# Patient Record
Sex: FEMALE | Race: WHITE | Employment: OTHER | URBAN - METROPOLITAN AREA
[De-identification: names, ages, dates, MRNs, and addresses within clinical notes are randomized per-mention and may not be internally consistent; named-entity substitution may affect disease eponyms.]

---

## 2017-06-06 ENCOUNTER — OP HISTORICAL/CONVERTED ENCOUNTER (OUTPATIENT)
Dept: OTHER | Age: 67
End: 2017-06-06

## 2017-11-06 ENCOUNTER — OP HISTORICAL/CONVERTED ENCOUNTER (OUTPATIENT)
Dept: OTHER | Age: 67
End: 2017-11-06

## 2018-03-28 ENCOUNTER — IP HISTORICAL/CONVERTED ENCOUNTER (OUTPATIENT)
Dept: OTHER | Age: 68
End: 2018-03-28

## 2018-06-25 ENCOUNTER — OP HISTORICAL/CONVERTED ENCOUNTER (OUTPATIENT)
Dept: OTHER | Age: 68
End: 2018-06-25

## 2018-07-02 ENCOUNTER — IP HISTORICAL/CONVERTED ENCOUNTER (OUTPATIENT)
Dept: OTHER | Age: 68
End: 2018-07-02

## 2018-09-15 ENCOUNTER — IP HISTORICAL/CONVERTED ENCOUNTER (OUTPATIENT)
Dept: OTHER | Age: 68
End: 2018-09-15

## 2018-10-10 ENCOUNTER — IP HISTORICAL/CONVERTED ENCOUNTER (OUTPATIENT)
Dept: OTHER | Age: 68
End: 2018-10-10

## 2018-11-08 ENCOUNTER — IP HISTORICAL/CONVERTED ENCOUNTER (OUTPATIENT)
Dept: OTHER | Age: 68
End: 2018-11-08

## 2018-11-28 ENCOUNTER — OP HISTORICAL/CONVERTED ENCOUNTER (OUTPATIENT)
Dept: OTHER | Age: 68
End: 2018-11-28

## 2018-12-09 ENCOUNTER — IP HISTORICAL/CONVERTED ENCOUNTER (OUTPATIENT)
Dept: OTHER | Age: 68
End: 2018-12-09

## 2018-12-18 ENCOUNTER — IP HISTORICAL/CONVERTED ENCOUNTER (OUTPATIENT)
Dept: OTHER | Age: 68
End: 2018-12-18

## 2018-12-22 ENCOUNTER — OP HISTORICAL/CONVERTED ENCOUNTER (OUTPATIENT)
Dept: OTHER | Age: 68
End: 2018-12-22

## 2019-01-04 ENCOUNTER — IP HISTORICAL/CONVERTED ENCOUNTER (OUTPATIENT)
Dept: OTHER | Age: 69
End: 2019-01-04

## 2019-01-13 ENCOUNTER — OP HISTORICAL/CONVERTED ENCOUNTER (OUTPATIENT)
Dept: OTHER | Age: 69
End: 2019-01-13

## 2019-01-15 ENCOUNTER — OP HISTORICAL/CONVERTED ENCOUNTER (OUTPATIENT)
Dept: OTHER | Age: 69
End: 2019-01-15

## 2019-02-12 ENCOUNTER — ED HISTORICAL/CONVERTED ENCOUNTER (OUTPATIENT)
Dept: OTHER | Age: 69
End: 2019-02-12

## 2019-02-16 ENCOUNTER — ED HISTORICAL/CONVERTED ENCOUNTER (OUTPATIENT)
Dept: OTHER | Age: 69
End: 2019-02-16

## 2019-02-17 ENCOUNTER — IP HISTORICAL/CONVERTED ENCOUNTER (OUTPATIENT)
Dept: OTHER | Age: 69
End: 2019-02-17

## 2019-02-20 ENCOUNTER — TELEPHONE (OUTPATIENT)
Dept: CARDIOLOGY CLINIC | Age: 69
End: 2019-02-20

## 2019-02-20 NOTE — TELEPHONE ENCOUNTER
Patient's [facility] is calling again to r/s appt with Dr. Radha Barnett. She will not be able to do 2/22/19. Please call back @ [phone]    Thanks!

## 2019-02-25 ENCOUNTER — OP HISTORICAL/CONVERTED ENCOUNTER (OUTPATIENT)
Dept: OTHER | Age: 69
End: 2019-02-25

## 2019-02-27 ENCOUNTER — OFFICE VISIT (OUTPATIENT)
Dept: CARDIOLOGY CLINIC | Age: 69
End: 2019-02-27

## 2019-02-27 VITALS
DIASTOLIC BLOOD PRESSURE: 62 MMHG | OXYGEN SATURATION: 95 % | HEART RATE: 72 BPM | RESPIRATION RATE: 20 BRPM | WEIGHT: 197.6 LBS | BODY MASS INDEX: 36.36 KG/M2 | SYSTOLIC BLOOD PRESSURE: 110 MMHG | HEIGHT: 62 IN

## 2019-02-27 DIAGNOSIS — I48.0 PAROXYSMAL ATRIAL FIBRILLATION (HCC): Primary | ICD-10-CM

## 2019-02-27 DIAGNOSIS — I47.1 SVT (SUPRAVENTRICULAR TACHYCARDIA) (HCC): ICD-10-CM

## 2019-02-27 RX ORDER — RANOLAZINE 500 MG/1
TABLET, FILM COATED, EXTENDED RELEASE ORAL
Refills: 0 | COMMUNITY
Start: 2019-02-16

## 2019-02-27 RX ORDER — RANITIDINE 150 MG/1
150 CAPSULE ORAL 2 TIMES DAILY
COMMUNITY
End: 2020-01-01

## 2019-02-27 RX ORDER — POTASSIUM CHLORIDE 1500 MG/1
20 TABLET, FILM COATED, EXTENDED RELEASE ORAL DAILY
COMMUNITY
Start: 2019-02-22

## 2019-02-27 RX ORDER — NITROGLYCERIN 0.4 MG/1
0.4 TABLET SUBLINGUAL
COMMUNITY

## 2019-02-27 RX ORDER — ASPIRIN 81 MG/1
TABLET ORAL DAILY
COMMUNITY

## 2019-02-27 RX ORDER — MELATONIN 5 MG
5 CAPSULE ORAL
COMMUNITY

## 2019-02-27 RX ORDER — SACUBITRIL AND VALSARTAN 24; 26 MG/1; MG/1
1 TABLET, FILM COATED ORAL
COMMUNITY
Start: 2019-02-12

## 2019-02-27 RX ORDER — ALBUTEROL SULFATE 2.5 MG/.5ML
SOLUTION RESPIRATORY (INHALATION) 2 TIMES DAILY
COMMUNITY

## 2019-02-27 RX ORDER — METOPROLOL SUCCINATE 25 MG/1
TABLET, EXTENDED RELEASE ORAL
Refills: 0 | COMMUNITY
Start: 2019-01-08

## 2019-02-27 RX ORDER — ACETAMINOPHEN 500 MG
TABLET ORAL
COMMUNITY

## 2019-02-27 RX ORDER — GABAPENTIN 100 MG/1
100 CAPSULE ORAL 3 TIMES DAILY
COMMUNITY
Start: 2019-02-08

## 2019-02-27 NOTE — PROGRESS NOTES
HISTORY OF PRESENTING ILLNESS Ernestina Slater is a 70 y.o. female with ICM, LVEF 35-40%, persistent atrial fibrillation with progression in fatigue over the past 1 month, hypothyroidism, chronic SOB (since pneumonia 3 years ago) for which she takes supplemental oxygen, CAD/CABG, hypertension, Hx myocardial infarction who recently had evaluation in the ER for AF with RVR. She was intolerant of amiodarone in the past.  
 
 
 ACTIVE PROBLEM LIST There are no active problems to display for this patient. PAST MEDICAL HISTORY No past medical history on file. PAST SURGICAL HISTORY Past Surgical History:  
Procedure Laterality Date  HX  SECTION    
 HX CHOLECYSTECTOMY  HX HERNIA REPAIR ALLERGIES No Known Allergies FAMILY HISTORY Family History Problem Relation Age of Onset  Diabetes Mother  Hypertension Father  Hypertension Paternal Aunt  Diabetes Maternal Grandmother  Hypertension Paternal Grandmother  Hypertension Other   
 negative for cardiac disease SOCIAL HISTORY Social History Socioeconomic History  Marital status:  Spouse name: Not on file  Number of children: Not on file  Years of education: Not on file  Highest education level: Not on file Tobacco Use  Smoking status: Never Smoker Substance and Sexual Activity  Alcohol use: No  
 Drug use: No  
 
 
 
MEDICATIONS Current Outpatient Medications Medication Sig  
 NOVOLIN 70/30 100 unit/mL (70-30) injection INJECT 60 UNITS SUBCUTANEOUS 20 MINUTES BEFORE BREAKFAST AND 60 UNITS 20 MINUTES BEFORE DINNER  
 glucose blood VI test strips (ACCU-CHEK JUSTICE PLUS TEST STRP) strip Test 3 times daily Dx Code 250.00  ACCU-CHEK JUSTICE PLUS METER misc  sertraline (ZOLOFT) 100 mg tablet  losartan (COZAAR) 50 mg tablet  atenolol (TENORMIN) 25 mg tablet  spironolactone (ALDACTONE) 25 mg tablet  levothyroxine (SYNTHROID) 100 mcg tablet  furosemide (LASIX) 20 mg tablet  cetirizine (ZYRTEC) 10 mg tablet  aspirin (ASPIRIN) 325 mg tablet Take 325 mg by mouth daily.  metFORMIN (GLUCOPHAGE) 1,000 mg tablet Take 1 Tab by mouth two (2) times daily (with meals).  insulin syringe-needle U-100 (BD INSULIN SYRINGE ULTRA-FINE) 1 mL 31 x 15/64\" syrg 1 Syringe by SubCUTAneous route two (2) times a day.  Lancets (ACCU-CHEK SOFTCLIX LANCETS) misc Test 4 times daily. Dx code 250.00 No current facility-administered medications for this visit. I have reviewed the nurses notes, vitals, problem list, allergy list, medical history, family, social history and medications. REVIEW OF SYMPTOMS General: Pt denies excessive weight gain or loss. Pt is able to conduct ADL's HEENT: Denies blurred vision, headaches, hearing loss, epistaxis and difficulty swallowing. Respiratory: Denies cough, congestion, shortness of breath, [place]RA[place], wheezing or stridor. Cardiovascular: Denies precordial pain, palpitations, edema or PND Gastrointestinal: Denies poor appetite, indigestion, abdominal pain or blood in stool Genitourinary: Denies hematuria, dysuria, increased urinary frequency Musculoskeletal: Denies joint pain or swelling from muscles or joints Neurologic: Denies tremor, paresthesias, headache, or sensory motor disturbance Psychiatric: Denies confusion, insomnia, depression Integumentray: Denies rash, itching or ulcers. Hematologic: Denies easy bruising, bleeding PHYSICAL EXAMINATION There were no vitals filed for this visit. General: Well developed, in no acute distress. HEENT: No jaundice, oral mucosa moist, no oral ulcers Neck: Supple, no stiffness, no lymphadenopathy, supple Heart:  irregularly irregular, no murmur, gallop or rub, no jugular venous distention Respiratory: Clear bilaterally x 4, no wheezing or rales Abdomen:   Soft, non-tender, bowel sounds are active.  
 Extremities:  No edema, normal cap refill, no cyanosis. Musculoskeletal: No clubbing, no deformities Neuro: A&Ox3, speech clear, gait stable, cooperative, no focal neurologic deficits Skin: Skin color is normal. No rashes or lesions. Non diaphoretic, moist. 
Vascular: 2+ pulses symmetric in all extremities DIAGNOSTIC DATA EKG: AF 
 
 
 LABORATORY DATA No results found for: WBC, HGBPOC, HGB, HGBP, HCTPOC, HCT, PHCT, RBCH, PLT, MCV, HGBEXT, HCTEXT, PLTEXT No results found for: NA, K, CL, CO2, AGAP, GLU, BUN, CREA, BUCR, GFRAA, GFRNA, CA, TBIL, TBILI, GPT, SGOT, AP, TP, ALB, GLOB, AGRAT, ALT  
 
 
 ASSESSMENT 1. Cardiomyopathy A. Ischemic B. LVEF 35-40% C. NYHA class II 
2. Fatigue 3. Atrial fibrillation A. Long-standing persistent 4. CAD, native 5. CABG 6. MRSA leg infection PLAN Given intolerance to amiodarone in the past and that she has long-standing persistent AF with difficult to control HR's, recommend CRTP and AV node ablation rather than pulmonary vein isolation (likely unable to tolerate). Will plan for procedure once MRSA infection has cleared. Hold eliquis 2 days prior. FOLLOW-UP Post procedure Thank you, Beverley Swanson MD and Dr. Nano Lyman for allowing me to participate in the care of this extraordinarily pleasant female. Please do not hesitate to contact me for further questions/concerns. Ramiro Lopez MD 
Cardiac Electrophysiology / Cardiology [address] [place] [address], [address] [place] [address], [address] 
[phone] / [phone] Fax   [phone] / [phone] Fax

## 2019-03-01 ENCOUNTER — OP HISTORICAL/CONVERTED ENCOUNTER (OUTPATIENT)
Dept: OTHER | Age: 69
End: 2019-03-01

## 2019-03-04 ENCOUNTER — OP HISTORICAL/CONVERTED ENCOUNTER (OUTPATIENT)
Dept: OTHER | Age: 69
End: 2019-03-04

## 2019-03-15 ENCOUNTER — OP HISTORICAL/CONVERTED ENCOUNTER (OUTPATIENT)
Dept: OTHER | Age: 69
End: 2019-03-15

## 2019-03-18 ENCOUNTER — OP HISTORICAL/CONVERTED ENCOUNTER (OUTPATIENT)
Dept: OTHER | Age: 69
End: 2019-03-18

## 2019-04-01 ENCOUNTER — OP HISTORICAL/CONVERTED ENCOUNTER (OUTPATIENT)
Dept: OTHER | Age: 69
End: 2019-04-01

## 2019-05-01 ENCOUNTER — OP HISTORICAL/CONVERTED ENCOUNTER (OUTPATIENT)
Dept: OTHER | Age: 69
End: 2019-05-01

## 2019-05-10 ENCOUNTER — OP HISTORICAL/CONVERTED ENCOUNTER (OUTPATIENT)
Dept: OTHER | Age: 69
End: 2019-05-10

## 2019-06-01 ENCOUNTER — OP HISTORICAL/CONVERTED ENCOUNTER (OUTPATIENT)
Dept: OTHER | Age: 69
End: 2019-06-01

## 2019-07-01 ENCOUNTER — OP HISTORICAL/CONVERTED ENCOUNTER (OUTPATIENT)
Dept: OTHER | Age: 69
End: 2019-07-01

## 2019-08-01 ENCOUNTER — OP HISTORICAL/CONVERTED ENCOUNTER (OUTPATIENT)
Dept: OTHER | Age: 69
End: 2019-08-01

## 2019-09-01 ENCOUNTER — OP HISTORICAL/CONVERTED ENCOUNTER (OUTPATIENT)
Dept: OTHER | Age: 69
End: 2019-09-01

## 2019-09-03 ENCOUNTER — ED HISTORICAL/CONVERTED ENCOUNTER (OUTPATIENT)
Dept: OTHER | Age: 69
End: 2019-09-03

## 2019-10-01 ENCOUNTER — OP HISTORICAL/CONVERTED ENCOUNTER (OUTPATIENT)
Dept: OTHER | Age: 69
End: 2019-10-01

## 2019-10-31 ENCOUNTER — OP HISTORICAL/CONVERTED ENCOUNTER (OUTPATIENT)
Dept: OTHER | Age: 69
End: 2019-10-31

## 2019-11-01 ENCOUNTER — OP HISTORICAL/CONVERTED ENCOUNTER (OUTPATIENT)
Dept: OTHER | Age: 69
End: 2019-11-01

## 2020-01-01 ENCOUNTER — HOSPITAL ENCOUNTER (INPATIENT)
Age: 70
LOS: 3 days | DRG: 284 | End: 2021-01-03
Attending: EMERGENCY MEDICINE | Admitting: FAMILY MEDICINE
Payer: MEDICARE

## 2020-01-01 ENCOUNTER — APPOINTMENT (OUTPATIENT)
Dept: GENERAL RADIOLOGY | Age: 70
DRG: 284 | End: 2020-01-01
Attending: EMERGENCY MEDICINE
Payer: MEDICARE

## 2020-01-01 DIAGNOSIS — I21.3 ST ELEVATION MYOCARDIAL INFARCTION (STEMI), UNSPECIFIED ARTERY (HCC): ICD-10-CM

## 2020-01-01 DIAGNOSIS — I48.91 ATRIAL FIBRILLATION, UNSPECIFIED TYPE (HCC): Primary | ICD-10-CM

## 2020-01-01 LAB
ALBUMIN SERPL-MCNC: 3.4 G/DL (ref 3.5–5)
ALBUMIN/GLOB SERPL: 0.8 {RATIO} (ref 1.1–2.2)
ALP SERPL-CCNC: 49 U/L (ref 45–117)
ALT SERPL-CCNC: 13 U/L (ref 12–78)
ANION GAP SERPL CALC-SCNC: 9 MMOL/L (ref 5–15)
AST SERPL W P-5'-P-CCNC: 15 U/L (ref 15–37)
ATRIAL RATE: 163 BPM
BASOPHILS # BLD: 0 K/UL (ref 0–0.1)
BASOPHILS NFR BLD: 0 % (ref 0–1)
BILIRUB SERPL-MCNC: 0.3 MG/DL (ref 0.2–1)
BUN SERPL-MCNC: 38 MG/DL (ref 6–20)
BUN/CREAT SERPL: 29 (ref 12–20)
CA-I BLD-MCNC: 9.5 MG/DL (ref 8.5–10.1)
CALCULATED R AXIS, ECG10: -177 DEGREES
CALCULATED T AXIS, ECG11: -151 DEGREES
CHLORIDE SERPL-SCNC: 101 MMOL/L (ref 97–108)
CO2 SERPL-SCNC: 28 MMOL/L (ref 21–32)
CREAT SERPL-MCNC: 1.29 MG/DL (ref 0.55–1.02)
DIAGNOSIS, 93000: NORMAL
DIFFERENTIAL METHOD BLD: ABNORMAL
EOSINOPHIL # BLD: 0.1 K/UL (ref 0–0.4)
EOSINOPHIL NFR BLD: 1 % (ref 0–7)
ERYTHROCYTE [DISTWIDTH] IN BLOOD BY AUTOMATED COUNT: 14.5 % (ref 11.5–14.5)
GLOBULIN SER CALC-MCNC: 4.1 G/DL (ref 2–4)
GLUCOSE BLD STRIP.AUTO-MCNC: 453 MG/DL (ref 65–100)
GLUCOSE BLD STRIP.AUTO-MCNC: 493 MG/DL (ref 65–100)
GLUCOSE SERPL-MCNC: 254 MG/DL (ref 65–100)
HCT VFR BLD AUTO: 37.3 % (ref 35–47)
HGB BLD-MCNC: 11.4 G/DL (ref 11.5–16)
IMM GRANULOCYTES # BLD AUTO: 0.1 K/UL (ref 0–0.04)
IMM GRANULOCYTES NFR BLD AUTO: 1 % (ref 0–0.5)
LYMPHOCYTES # BLD: 2 K/UL (ref 0.8–3.5)
LYMPHOCYTES NFR BLD: 19 % (ref 12–49)
MCH RBC QN AUTO: 29.2 PG (ref 26–34)
MCHC RBC AUTO-ENTMCNC: 30.6 G/DL (ref 30–36.5)
MCV RBC AUTO: 95.6 FL (ref 80–99)
MONOCYTES # BLD: 0.6 K/UL (ref 0–1)
MONOCYTES NFR BLD: 5 % (ref 5–13)
NEUTS SEG # BLD: 7.8 K/UL (ref 1.8–8)
NEUTS SEG NFR BLD: 74 % (ref 32–75)
PERFORMED BY, TECHID: ABNORMAL
PERFORMED BY, TECHID: ABNORMAL
PLATELET # BLD AUTO: 246 K/UL (ref 150–400)
PMV BLD AUTO: 10.3 FL (ref 8.9–12.9)
POTASSIUM SERPL-SCNC: 3.4 MMOL/L (ref 3.5–5.1)
PROT SERPL-MCNC: 7.5 G/DL (ref 6.4–8.2)
Q-T INTERVAL, ECG07: 338 MS
QRS DURATION, ECG06: 96 MS
QTC CALCULATION (BEZET), ECG08: 526 MS
RBC # BLD AUTO: 3.9 M/UL (ref 3.8–5.2)
SODIUM SERPL-SCNC: 138 MMOL/L (ref 136–145)
TROPONIN I SERPL-MCNC: <0.05 NG/ML
VENTRICULAR RATE, ECG03: 146 BPM
WBC # BLD AUTO: 10.4 K/UL (ref 3.6–11)

## 2020-01-01 PROCEDURE — 74011636637 HC RX REV CODE- 636/637: Performed by: NURSE PRACTITIONER

## 2020-01-01 PROCEDURE — 74011250637 HC RX REV CODE- 250/637: Performed by: NURSE PRACTITIONER

## 2020-01-01 PROCEDURE — 36415 COLL VENOUS BLD VENIPUNCTURE: CPT

## 2020-01-01 PROCEDURE — 85025 COMPLETE CBC W/AUTO DIFF WBC: CPT

## 2020-01-01 PROCEDURE — 74011636637 HC RX REV CODE- 636/637: Performed by: INTERNAL MEDICINE

## 2020-01-01 PROCEDURE — 65270000032 HC RM SEMIPRIVATE

## 2020-01-01 PROCEDURE — C9113 INJ PANTOPRAZOLE SODIUM, VIA: HCPCS | Performed by: EMERGENCY MEDICINE

## 2020-01-01 PROCEDURE — 96375 TX/PRO/DX INJ NEW DRUG ADDON: CPT

## 2020-01-01 PROCEDURE — 74011000258 HC RX REV CODE- 258: Performed by: EMERGENCY MEDICINE

## 2020-01-01 PROCEDURE — 99285 EMERGENCY DEPT VISIT HI MDM: CPT

## 2020-01-01 PROCEDURE — 71045 X-RAY EXAM CHEST 1 VIEW: CPT

## 2020-01-01 PROCEDURE — 74011250637 HC RX REV CODE- 250/637: Performed by: EMERGENCY MEDICINE

## 2020-01-01 PROCEDURE — 83036 HEMOGLOBIN GLYCOSYLATED A1C: CPT

## 2020-01-01 PROCEDURE — 84484 ASSAY OF TROPONIN QUANT: CPT

## 2020-01-01 PROCEDURE — 80053 COMPREHEN METABOLIC PANEL: CPT

## 2020-01-01 PROCEDURE — 96365 THER/PROPH/DIAG IV INF INIT: CPT

## 2020-01-01 PROCEDURE — 74011250636 HC RX REV CODE- 250/636: Performed by: EMERGENCY MEDICINE

## 2020-01-01 PROCEDURE — 82962 GLUCOSE BLOOD TEST: CPT

## 2020-01-01 PROCEDURE — 93005 ELECTROCARDIOGRAM TRACING: CPT

## 2020-01-01 RX ORDER — MAGNESIUM SULFATE 100 %
4 CRYSTALS MISCELLANEOUS AS NEEDED
Status: DISCONTINUED | OUTPATIENT
Start: 2020-01-01 | End: 2021-01-01 | Stop reason: HOSPADM

## 2020-01-01 RX ORDER — SODIUM CHLORIDE 0.9 % (FLUSH) 0.9 %
5-40 SYRINGE (ML) INJECTION EVERY 8 HOURS
Status: DISCONTINUED | OUTPATIENT
Start: 2020-01-01 | End: 2021-01-01 | Stop reason: HOSPADM

## 2020-01-01 RX ORDER — SODIUM CHLORIDE 0.9 % (FLUSH) 0.9 %
5-40 SYRINGE (ML) INJECTION AS NEEDED
Status: DISCONTINUED | OUTPATIENT
Start: 2020-01-01 | End: 2021-01-01 | Stop reason: HOSPADM

## 2020-01-01 RX ORDER — ONDANSETRON 2 MG/ML
4 INJECTION INTRAMUSCULAR; INTRAVENOUS
Status: DISCONTINUED | OUTPATIENT
Start: 2020-01-01 | End: 2021-01-01 | Stop reason: HOSPADM

## 2020-01-01 RX ORDER — ACETAMINOPHEN 650 MG/1
650 SUPPOSITORY RECTAL
Status: DISCONTINUED | OUTPATIENT
Start: 2020-01-01 | End: 2021-01-01 | Stop reason: HOSPADM

## 2020-01-01 RX ORDER — CHOLECALCIFEROL (VITAMIN D3) 125 MCG
5 CAPSULE ORAL
Status: DISCONTINUED | OUTPATIENT
Start: 2020-01-01 | End: 2021-01-01 | Stop reason: HOSPADM

## 2020-01-01 RX ORDER — INSULIN LISPRO 100 [IU]/ML
8 INJECTION, SOLUTION INTRAVENOUS; SUBCUTANEOUS ONCE
Status: COMPLETED | OUTPATIENT
Start: 2020-01-01 | End: 2020-01-01

## 2020-01-01 RX ORDER — POLYETHYLENE GLYCOL 3350 17 G/17G
17 POWDER, FOR SOLUTION ORAL DAILY PRN
Status: DISCONTINUED | OUTPATIENT
Start: 2020-01-01 | End: 2021-01-01 | Stop reason: HOSPADM

## 2020-01-01 RX ORDER — LEVOTHYROXINE SODIUM 100 UG/1
100 TABLET ORAL
Status: DISCONTINUED | OUTPATIENT
Start: 2021-01-01 | End: 2021-01-01 | Stop reason: HOSPADM

## 2020-01-01 RX ORDER — MAG HYDROX/ALUMINUM HYD/SIMETH 200-200-20
30 SUSPENSION, ORAL (FINAL DOSE FORM) ORAL ONCE
Status: COMPLETED | OUTPATIENT
Start: 2020-01-01 | End: 2020-01-01

## 2020-01-01 RX ORDER — PROMETHAZINE HYDROCHLORIDE 25 MG/1
12.5 TABLET ORAL
Status: DISCONTINUED | OUTPATIENT
Start: 2020-01-01 | End: 2021-01-01 | Stop reason: HOSPADM

## 2020-01-01 RX ORDER — CHOLECALCIFEROL (VITAMIN D3) 125 MCG
50 CAPSULE ORAL DAILY
COMMUNITY

## 2020-01-01 RX ORDER — MELATONIN
50 DAILY
Status: DISCONTINUED | OUTPATIENT
Start: 2021-01-01 | End: 2021-01-01 | Stop reason: HOSPADM

## 2020-01-01 RX ORDER — ASPIRIN 81 MG/1
81 TABLET ORAL DAILY
Status: DISCONTINUED | OUTPATIENT
Start: 2021-01-01 | End: 2021-01-01 | Stop reason: HOSPADM

## 2020-01-01 RX ORDER — ACETAMINOPHEN 325 MG/1
650 TABLET ORAL
Status: DISCONTINUED | OUTPATIENT
Start: 2020-01-01 | End: 2021-01-01 | Stop reason: HOSPADM

## 2020-01-01 RX ORDER — ATORVASTATIN CALCIUM 20 MG/1
10 TABLET, FILM COATED ORAL DAILY
Status: DISCONTINUED | OUTPATIENT
Start: 2021-01-01 | End: 2021-01-01 | Stop reason: HOSPADM

## 2020-01-01 RX ORDER — ICOSAPENT ETHYL 1000 MG/1
2 CAPSULE ORAL EVERY 12 HOURS
Status: DISCONTINUED | OUTPATIENT
Start: 2020-01-01 | End: 2021-01-01 | Stop reason: HOSPADM

## 2020-01-01 RX ORDER — ICOSAPENT ETHYL 500 MG/1
0.5 CAPSULE ORAL 2 TIMES DAILY
COMMUNITY

## 2020-01-01 RX ORDER — SERTRALINE HYDROCHLORIDE 50 MG/1
100 TABLET, FILM COATED ORAL DAILY
Status: DISCONTINUED | OUTPATIENT
Start: 2021-01-01 | End: 2021-01-01 | Stop reason: HOSPADM

## 2020-01-01 RX ORDER — SPIRONOLACTONE 25 MG/1
12.5 TABLET ORAL DAILY
Status: DISCONTINUED | OUTPATIENT
Start: 2021-01-01 | End: 2021-01-01 | Stop reason: HOSPADM

## 2020-01-01 RX ORDER — METOPROLOL SUCCINATE 25 MG/1
25 TABLET, EXTENDED RELEASE ORAL DAILY
Status: DISCONTINUED | OUTPATIENT
Start: 2021-01-01 | End: 2021-01-01

## 2020-01-01 RX ORDER — UREA 10 %
100 LOTION (ML) TOPICAL DAILY
COMMUNITY

## 2020-01-01 RX ORDER — ATORVASTATIN CALCIUM 10 MG/1
10 TABLET, FILM COATED ORAL DAILY
COMMUNITY

## 2020-01-01 RX ORDER — LANOLIN ALCOHOL/MO/W.PET/CERES
400 CREAM (GRAM) TOPICAL DAILY
Status: DISCONTINUED | OUTPATIENT
Start: 2021-01-01 | End: 2021-01-01 | Stop reason: HOSPADM

## 2020-01-01 RX ORDER — GABAPENTIN 100 MG/1
100 CAPSULE ORAL 3 TIMES DAILY
Status: DISCONTINUED | OUTPATIENT
Start: 2020-01-01 | End: 2021-01-01 | Stop reason: HOSPADM

## 2020-01-01 RX ORDER — LANOLIN ALCOHOL/MO/W.PET/CERES
400 CREAM (GRAM) TOPICAL DAILY
COMMUNITY

## 2020-01-01 RX ORDER — FAMOTIDINE 20 MG/1
20 TABLET, FILM COATED ORAL 2 TIMES DAILY
COMMUNITY

## 2020-01-01 RX ORDER — RANOLAZINE 500 MG/1
500 TABLET, EXTENDED RELEASE ORAL DAILY
Status: DISCONTINUED | OUTPATIENT
Start: 2021-01-01 | End: 2021-01-01 | Stop reason: HOSPADM

## 2020-01-01 RX ORDER — METFORMIN HYDROCHLORIDE 500 MG/1
1000 TABLET ORAL 2 TIMES DAILY WITH MEALS
Status: DISCONTINUED | OUTPATIENT
Start: 2020-01-01 | End: 2021-01-01 | Stop reason: HOSPADM

## 2020-01-01 RX ORDER — INSULIN LISPRO 100 [IU]/ML
INJECTION, SOLUTION INTRAVENOUS; SUBCUTANEOUS
Status: DISCONTINUED | OUTPATIENT
Start: 2020-01-01 | End: 2021-01-01 | Stop reason: HOSPADM

## 2020-01-01 RX ORDER — POTASSIUM CHLORIDE 750 MG/1
40 TABLET, FILM COATED, EXTENDED RELEASE ORAL
Status: COMPLETED | OUTPATIENT
Start: 2020-01-01 | End: 2020-01-01

## 2020-01-01 RX ORDER — UREA 10 %
100 LOTION (ML) TOPICAL DAILY
Status: DISCONTINUED | OUTPATIENT
Start: 2021-01-01 | End: 2021-01-01 | Stop reason: HOSPADM

## 2020-01-01 RX ORDER — DEXTROSE 50 % IN WATER (D50W) INTRAVENOUS SYRINGE
25-50 AS NEEDED
Status: DISCONTINUED | OUTPATIENT
Start: 2020-01-01 | End: 2021-01-01 | Stop reason: HOSPADM

## 2020-01-01 RX ORDER — METOLAZONE 2.5 MG/1
2.5 TABLET ORAL ONCE
Status: COMPLETED | OUTPATIENT
Start: 2020-01-01 | End: 2020-01-01

## 2020-01-01 RX ADMIN — ICOSAPENT ETHYL 2 G: 1000 CAPSULE ORAL at 13:19

## 2020-01-01 RX ADMIN — ALUMINUM HYDROXIDE, MAGNESIUM HYDROXIDE, AND SIMETHICONE 30 ML: 200; 200; 20 SUSPENSION ORAL at 10:57

## 2020-01-01 RX ADMIN — INSULIN LISPRO 7 UNITS: 100 INJECTION, SOLUTION INTRAVENOUS; SUBCUTANEOUS at 17:17

## 2020-01-01 RX ADMIN — ICOSAPENT ETHYL 2 G: 1000 CAPSULE ORAL at 21:00

## 2020-01-01 RX ADMIN — INSULIN LISPRO 8 UNITS: 100 INJECTION, SOLUTION INTRAVENOUS; SUBCUTANEOUS at 18:31

## 2020-01-01 RX ADMIN — METFORMIN HYDROCHLORIDE 1000 MG: 500 TABLET ORAL at 17:18

## 2020-01-01 RX ADMIN — Medication 10 ML: at 22:00

## 2020-01-01 RX ADMIN — AMIODARONE HYDROCHLORIDE 1 MG/MIN: 50 INJECTION, SOLUTION INTRAVENOUS at 11:39

## 2020-01-01 RX ADMIN — INSULIN LISPRO 4 UNITS: 100 INJECTION, SOLUTION INTRAVENOUS; SUBCUTANEOUS at 22:00

## 2020-01-01 RX ADMIN — AMIODARONE HYDROCHLORIDE 150 MG: 50 INJECTION, SOLUTION INTRAVENOUS at 11:29

## 2020-01-01 RX ADMIN — GABAPENTIN 100 MG: 100 CAPSULE ORAL at 23:53

## 2020-01-01 RX ADMIN — Medication 10 ML: at 23:57

## 2020-01-01 RX ADMIN — INSULIN HUMAN 34 UNITS: 100 INJECTION, SUSPENSION SUBCUTANEOUS at 18:13

## 2020-01-01 RX ADMIN — POTASSIUM CHLORIDE 40 MEQ: 750 TABLET, FILM COATED, EXTENDED RELEASE ORAL at 17:06

## 2020-01-01 RX ADMIN — GABAPENTIN 100 MG: 100 CAPSULE ORAL at 17:06

## 2020-01-01 RX ADMIN — METOLAZONE 2.5 MG: 2.5 TABLET ORAL at 13:24

## 2020-01-01 RX ADMIN — APIXABAN 5 MG: 5 TABLET, FILM COATED ORAL at 23:53

## 2020-01-01 RX ADMIN — PANTOPRAZOLE SODIUM 40 MG: 40 INJECTION, POWDER, FOR SOLUTION INTRAVENOUS at 10:56

## 2020-12-31 PROBLEM — I48.91 A-FIB (HCC): Status: ACTIVE | Noted: 2020-01-01

## 2020-12-31 PROBLEM — R07.89 CHEST DISCOMFORT: Status: ACTIVE | Noted: 2020-01-01

## 2020-12-31 NOTE — ED PROVIDER NOTES
EMERGENCY DEPARTMENT HISTORY AND PHYSICAL EXAM 
 
 
Date: 12/31/2020 Patient Name: Hilario Rizvi History of Presenting Illness Chief Complaint Patient presents with  Chest Pain History Provided By: Mayo Nunez HPI: Hilario Rizvi, 69 y.o. female with a past medical history significant diabetes, hypertension, hyperlipidemia, obesity and GERD presents to the ED with cc of burning in her esophagus that started about 48 hours ago. Patient states her no exacerbating or relieving factors and she is taking her medicine for her GERD. She specifically states that she has had no fever, chills, shortness of breath, rash, diarrhea, headache, night sweats. The pain does not radiate and is mild to moderate in severity at this point time. She denies any other treatments. There are no other complaints, changes, or physical findings at this time. PCP: Obi Linda,  
 
Current Facility-Administered Medications Medication Dose Route Frequency Provider Last Rate Last Admin  
 amiodarone (CORDARONE) 375 mg in dextrose 5% 250 mL infusion  1 mg/min IntraVENous CONTINUOUS Keisha Brito, NP 40 mL/hr at 01/01/21 0328 1 mg/min at 01/01/21 0328  sodium chloride (NS) flush 5-40 mL  5-40 mL IntraVENous Q8H Keisha Brito, NP   10 mL at 01/01/21 0600  
 sodium chloride (NS) flush 5-40 mL  5-40 mL IntraVENous PRN Keisha Brito, NP   10 mL at 01/01/21 0719  acetaminophen (TYLENOL) tablet 650 mg  650 mg Oral Q6H PRN Keisha Brito, NP [place]  
 acetaminophen (TYLENOL) suppository 650 mg  650 mg Rectal Q6H PRN Keisha Brito, NP      
 polyethylene glycol (MIRALAX) packet 17 g  17 g Oral DAILY PRN Keisha Brito, NP      
 promethazine (PHENERGAN) tablet 12.5 mg  12.5 mg Oral Q6H PRN Keisha Brito, NP  Or  
 ondansetron (ZOFRAN) injection 4 mg  4 mg IntraVENous Q6H PRN Keisha Brito, NP      
  apixaban (ELIQUIS) tablet 5 mg  5 mg Oral BID Choteau Bunkers, NP   5 mg at 12/31/20 2353  aspirin delayed-release tablet 81 mg  81 mg Oral DAILY Choteau Bunkers, NP      
 atorvastatin (LIPITOR) tablet 10 mg  10 mg Oral DAILY Choteau Bunkers, NP      
 cholecalciferol (VITAMIN D3) (1000 Units /25 mcg) tablet 2 Tab  50 mcg Oral DAILY Choteau Bunkers, NP      
 cyanocobalamin (VITAMIN B12) tablet 100 mcg  100 mcg Oral DAILY Choteau Bunkers, NP      
 [Held by provider] sacubitriL-valsartan (ENTRESTO) 24-26 mg tablet 1 Tab  1 Tab Oral Q12H Keyona Bunkers, NP      
 gabapentin (NEURONTIN) capsule 100 mg  100 mg Oral TID Choteau Bunkers, NP   100 mg at 12/31/20 2353  icosapent ethyL (VASCEPA) capsule 2 g  2 g Oral Q12H Keyona Bunkers, NP   2 g at 12/31/20 2100  levothyroxine (SYNTHROID) tablet 100 mcg  100 mcg Oral ACB Choteau Bunkers, NP      
 magnesium oxide (MAG-OX) tablet 400 mg  400 mg Oral DAILY Choteau Bunkers, NP      
 melatonin tablet 5 mg  5 mg Oral QHS Keyona Bunkers, NP      
 metFORMIN (GLUCOPHAGE) tablet 1,000 mg  1,000 mg Oral BID WITH MEALS Keyona Bunkers, NP   1,000 mg at 12/31/20 1718  metoprolol succinate (TOPROL-XL) XL tablet 25 mg  25 mg Oral DAILY Keyona Bunkers, NP      
 sertraline (ZOLOFT) tablet 100 mg  100 mg Oral DAILY Choteau Bunkers, NP      
 spironolactone (ALDACTONE) tablet 12.5 mg  12.5 mg Oral DAILY Choteau Bunkers, NP      
 ranolazine ER (RANEXA) tablet 500 mg  500 mg Oral DAILY Keyona Bunkers, NP      
 glucose chewable tablet 16 g  4 Tab Oral PRN Keyona Bunkers, NP      
 dextrose (D50W) injection syrg 12.5-25 g  25-50 mL IntraVENous PRN Keyona Bunkers, NP      
 glucagon (GLUCAGEN) injection 1 mg  1 mg IntraMUSCular PRN Choteau Bunkers, NP      
 insulin lispro (HUMALOG) injection   SubCUTAneous AC&HS Keyona Kauffmans, NP   4 Units at 12/31/20 2207  pantoprazole (PROTONIX) 40 mg in 0.9% sodium chloride 10 mL injection  40 mg IntraVENous DAILY Rodrigo Boyd NP   Stopped at 20 1304  insulin NPH/insulin regular (NOVOLIN 70/30, HUMULIN 70/30) injection 34 Units  34 Units SubCUTAneous DAILY WITH Richie Luther MD   34 Units at 20 1813  
 insulin NPH/insulin regular (NOVOLIN 70/30, HUMULIN 70/30) injection 44 Units  44 Units SubCUTAneous DAILY WITH BREAKFAST Tamala Dakins, MD      
 
 
Past History Past Medical History: 
Past Medical History:  
Diagnosis Date  Atrial fibrillation ([address]  Cataract   
 left eye  Chronic obstructive pulmonary disease ([address]  Congestive heart failure ([address]  Diabetes [address] [address] [facility]  Essential hypertension  YENNIFER on CPAP  Thyroid disease Past Surgical History: 
Past Surgical History:  
Procedure Laterality Date  HX  SECTION    
 HX CHOLECYSTECTOMY  HX CORONARY ARTERY BYPASS GRAFT  2005 Triple, [facility]CV  
 HX HERNIA REPAIR Family History: 
Family History Problem Relation Age of Onset  Hypertension Other  Diabetes Mother  Hypertension Father  Hypertension Paternal Aunt  Diabetes Maternal Grandmother  Hypertension Paternal Grandmother Social History: 
Social History Tobacco Use  Smoking status: Never Smoker  Smokeless tobacco: Never Used Substance Use Topics  Alcohol use: No  
 Drug use: No  
 
 
Allergies: Allergies Allergen Reactions  Lisinopril Cough Review of Systems Review of Systems Constitutional: Negative. Negative for appetite change, chills, fatigue and fever. HENT: Negative. Negative for congestion and sinus pain. Eyes: Negative. Negative for pain and visual disturbance. Respiratory: Negative. Negative for chest tightness and shortness of breath. Cardiovascular: Positive for chest pain. Gastrointestinal: Negative. Negative for abdominal pain, diarrhea, nausea and vomiting. Burning in her esophagus Genitourinary: Negative. Negative for difficulty urinating. No discharge Musculoskeletal: Negative. Negative for arthralgias. Skin: Negative. Negative for rash. Neurological: Negative. Negative for weakness and headaches. Hematological: Negative. Psychiatric/Behavioral: Negative. Negative for agitation. The patient is not nervous/anxious. All other systems reviewed and are negative. Physical Exam  
 
Physical Exam 
Vitals signs and nursing note reviewed. Constitutional:   
   General: She is not in acute distress. Appearance: She is well-developed. HENT:  
   Head: Normocephalic and atraumatic. Nose: Nose normal.  
   Mouth/Throat:  
   Mouth: Mucous membranes are moist.  
   Pharynx: Oropharynx is clear. No oropharyngeal exudate. Eyes:  
   General:     
   Right eye: No discharge. Left eye: No discharge. Conjunctiva/sclera: Conjunctivae normal.  
   Pupils: Pupils are equal, round, and reactive to light. Neck: Musculoskeletal: Normal range of motion and neck supple. Cardiovascular:  
   Rate and Rhythm: Normal rate and regular rhythm. Chest Wall: PMI is not displaced. No thrill. Heart sounds: Normal heart sounds. No murmur. No friction rub. No gallop. Pulmonary:  
   Effort: Pulmonary effort is normal. No respiratory distress. Breath sounds: Normal breath sounds. No wheezing or rales. Chest:  
   Chest wall: No tenderness. Abdominal:  
   General: Bowel sounds are normal. There is no distension. Palpations: Abdomen is soft. There is no mass. Tenderness: There is no abdominal tenderness. There is no guarding or rebound. Musculoskeletal: Normal range of motion. Lymphadenopathy:  
   Cervical: No cervical adenopathy. Skin: 
   General: Skin is warm and dry. Capillary Refill: Capillary refill takes less than 2 seconds. Findings: No erythema or rash. Neurological:  
   Mental Status: She is alert and oriented to person, place, and time. Cranial Nerves: No cranial nerve deficit. Coordination: Coordination normal.  
Psychiatric:     
   Mood and Affect: Mood normal.     
   Behavior: Behavior normal.  
 
 
 
Lab and Diagnostic Study Results Labs - Recent Results (from the past 12 hour(s)) GLUCOSE, POC Collection Time: 12/31/20  9:13 PM  
Result Value Ref Range Glucose (POC) 453 (H) 65 - 100 mg/dL Performed by [facility] Radiologic Studies -  
[unfilled] CT Results  (Last 48 hours) None CXR Results  (Last 48 hours) 12/31/20 1024  XR CHEST SNGL V Final result Impression:  IMPRESSION: No change in prior median sternotomy for CABG and cardiomegaly. Increasing interstitial infiltration which has the appearance of progressive  
pulmonary edema. Please see full report. Narrative:  History is chest pain. Comparisons with the chest x-ray of 9/3/2019. An AP portable upright view of the chest demonstrate a prior median sternotomy  
for CABG and persistent cardiomegaly. There is increased interstitial  
infiltration which may be due to pulmonary edema. There is no consolidation,  
effusion or pneumothorax. The osseous structures are intact. Medical Decision Making and ED Course - I am the first and primary provider for this patient AND AM THE PRIMARY PROVIDER OF RECORD. - I reviewed the vital signs, available nursing notes, past medical history, past surgical history, family history and social history. - Initial assessment performed. The patients presenting problems have been discussed, and the staff are in agreement with the care plan formulated and outlined with them. I have encouraged them to ask questions as they arise throughout their visit. Vital Signs-Reviewed the patient's vital signs. Patient Vitals for the past 12 hrs: 
 Pulse BP  
12/31/20 2000 (!) 129 119/71 EKG interpretation: (Preliminary): Performed at 10:09 AM, and read at 10 AM 
Ventricular rate 146 bpm, MS 1 medical, QRS duration 96 ms,  ms. Interpretation: A. fib with RVR. Right superior axis deviation. Nonspecific ST-T wave abnormality. Abnormal EKG. Records Reviewed: Nursing Notes and Old Medical Records The patient presents with chest pain with a differential diagnosis of  abnormal EKG, ACS, arrhythmia, acute MI, pulmonary edema/CHF, angina, aortic dissection, chest wall pain, GERD and pericarditis ED Course:  
Asses with basic and cardiac work-up ED Course as of Jan 01 0704 Thu Dec 31, 2020  
1032 Patient has a history of atrial fibrillation. She does appear to be in rapid ventricular response at this point time will start amiodarone.  
 [CS] ED Course User Index 
[CS] Amelia Sagastume MD  
 
 
 
Provider Notes (Medical Decision Making):  
Patient is a pleasant 70-year-old female no past medical history consistent with atrial fibrillation who is now complaining of chest and throat tightness. Patient was found to be in A. fib with RVR. We will start her on an amiodarone drip and admitted to the hospital for further evaluation. MDM Consultations:  
 
 
Consultations: -  Hospitalist Consultant: Dr. Comfort Hamilton: We have asked for emergent assistance with regard to this patient. We have discussed the patients HPI, ROS, PE and results this far. They will come and evaluate the patient for admission. Procedures and Critical Care Performed by: Meghan Denis MD 
PROCEDURES: 
Procedures CRITICAL CARE NOTE : 
10:02 AM 
Amount of Critical Care Time: 48(minutes) IMPENDING DETERIORATION -Cardiovascular ASSOCIATED RISK FACTORS - Dysrhythmia, Metabolic changes and Dehydration MANAGEMENT- Bedside Assessment and Supervision of Care INTERPRETATION -  ECG, Blood Pressure and Cardiac Output Measures INTERVENTIONS - hemodynamic mngmt and Metobolic interventions CASE REVIEW - Hospitalist 
TREATMENT RESPONSE -Stable PERFORMED BY - Self NOTES   : 
I have spent critical care time involved in lab review, consultations with specialist, family decision- making, bedside attention and documentation. This time excludes time spent in any separate billed procedures. During this entire length of time I was immediately available to the patient . Stacia Segura MD 
 
 
 
Disposition Disposition: Admitted to Floor Medical Floor the case was discussed with the admitting physician Admitted Diagnosis Clinical Impression: 1. Atrial fibrillation, unspecified type ([facility] Attestations: 
 
Stacia Segura MD 
 
Please note that this dictation was completed with [employer], the computer voice recognition software. Quite often unanticipated grammatical, syntax, homophones, and other interpretive errors are inadvertently transcribed by the computer software. Please disregard these errors. Please excuse any errors that have escaped final proofreading. Thank you.

## 2020-12-31 NOTE — ROUTINE PROCESS
TRANSFER - OUT REPORT: 
 
Verbal report given to Tru 52 (name) on Darren  being transferred to [facility](unit) for routine progression of care Report consisted of patients Situation, Background, Assessment and  
Recommendations(SBAR). Information from the following report(s) ED Summary was reviewed with the receiving nurse. Lines:  
Peripheral IV 12/31/20 Anterior; Left Forearm (Active) Peripheral IV 12/31/20 Left Arm (Active) Site Assessment Clean, dry, & intact 12/31/20 1050 Phlebitis Assessment 0 12/31/20 1050 Infiltration Assessment 0 12/31/20 1050 Dressing Status Clean, dry, & intact 12/31/20 1050 Dressing Type Tape;Transparent 12/31/20 1050 Hub Color/Line Status Pink;Flushed 12/31/20 1050 Alcohol Cap Used Yes 12/31/20 1050 Opportunity for questions and clarification was provided. Patient transported with: 
 Registered Nurse

## 2020-12-31 NOTE — H&P
Damaris MARSHALL, FNP-C History and Physical 
 
 
Patient: Hilario Rizvi MRN: [id number]  SSN: [id number] YOB: 1950  Age: 69 y.o. Sex: female Chief Complaint Patient presents with  Chest Pain Subjective:  
  
Hilario Rizvi is a 69 y.o. obese  female who presents to the ED with complaint of chest burning and sob, onset yesterday. PMH significant for COPD, GERD, Afib, and CHF. Patient reports chronic respiratory failure with home O2 usage. She reports taking her GERD medication for chest burning; however, did not receive any relief. Therefore she presented to the ED. In the ED patient noted to be in Afib RVR around 150s. EKG on admission shows Atrial fibrillation with rapid ventricular response with premature ventricular or aberrantly conducted complexes. Patient reports her cardiologist is Dr. Warnell Goldberg. Hospitalist consulted for further evaluation. Will admit to inpatient services. Past Medical History:  
Diagnosis Date  Atrial fibrillation ([address]  Cataract   
 left eye  Chronic obstructive pulmonary disease ([address]  Congestive heart failure ([address]  Diabetes ([address]  Essential hypertension  YENNIFER on CPAP  Thyroid disease Past Surgical History:  
Procedure Laterality Date  HX  SECTION    
 HX CHOLECYSTECTOMY  HX CORONARY ARTERY BYPASS GRAFT  2005 Triple, [facility]  
 HX HERNIA REPAIR Family History Problem Relation Age of Onset  Hypertension Other  Diabetes Mother  Hypertension Father  Hypertension Paternal Aunt  Diabetes Maternal Grandmother  Hypertension Paternal Grandmother Social History Tobacco Use  Smoking status: Never Smoker  Smokeless tobacco: Never Used Substance Use Topics  Alcohol use: No  
  
Prior to Admission medications Medication Sig Start Date End Date Taking? Authorizing Provider cyanocobalamin (Vitamin B-12) 100 mcg tablet Take 100 mcg by mouth daily. Yes Other, MD Tung  
famotidine (PEPCID) 20 mg tablet Take 20 mg by mouth two (2) times a day. Yes Other, MD Tung  
magnesium oxide (MAG-OX) 400 mg tablet Take 400 mg by mouth daily. Yes Other, MD Tung  
cholecalciferol, vitamin D3, (Vitamin D3) 50 mcg (2,000 unit) tab Take 50 mcg by mouth daily. Yes Other, MD Tung  
icosapent ethyL (Vascepa) 0.5 gram cap Take 0.5 g by mouth two (2) times a day. Take 4 capsules twice a day   Yes Other, MD Tung  
atorvastatin (LIPITOR) 10 mg tablet Take 10 mg by mouth daily. Yes Other, MD Tung  
ENTRESTO 24-26 mg tablet Take 1 Tab by mouth. 2/12/19  Yes Provider, Historical  
gabapentin (NEURONTIN) 100 mg capsule Take 100 mg by mouth three (3) times daily. 2/8/19  Yes Provider, Historical  
potassium chloride SR (K-TAB) 20 mEq tablet Take 20 mEq by mouth daily. 2/22/19  Yes Provider, Historical  
apixaban (ELIQUIS) 5 mg tablet Take 5 mg by mouth two (2) times a day. Yes Provider, Historical  
metoprolol succinate (TOPROL-XL) 25 mg XL tablet TK 1 T PO D 1/8/19  Yes Provider, Historical  
aspirin delayed-release 81 mg tablet Take  by mouth daily. Yes Provider, Historical  
NOVOLIN 70/30 100 unit/mL (70-30) injection INJECT 60 UNITS SUBCUTANEOUS 20 MINUTES BEFORE BREAKFAST AND 60 UNITS 20 MINUTES BEFORE DINNER Patient taking differently: 44 Units by SubCUTAneous route two (2) times a day. 44units each morning and 34 units each evening 9/12/17  Yes Nic Fang MD  
sertraline (ZOLOFT) 100 mg tablet Take 100 mg by mouth daily. 3/9/15  Yes Provider, Historical  
spironolactone (ALDACTONE) 25 mg tablet Take 12.5 mg by mouth daily. 2/22/15  Yes Provider, Historical  
furosemide (LASIX) 80 mg tablet 80 mg two (2) times a day.  Takes 80mg each morning and 40 mg each evening 2/16/15  Yes Provider, Historical  
 metFORMIN (GLUCOPHAGE) 1,000 mg tablet Take 1 Tab by mouth two (2) times daily (with meals). 4/8/15  Yes Anderson Garcia MD  
nitroglycerin (NITROSTAT) 0.4 mg SL tablet 0.4 mg by SubLINGual route every five (5) minutes as needed for Chest Pain. Up to 3 doses. Provider, Historical  
RANEXA 500 mg SR tablet TK 2 TS PO BID 2/16/19   Provider, Historical  
acetaminophen (TYLENOL EXTRA STRENGTH) 500 mg tablet Take  by mouth every six (6) hours as needed for Pain. Provider, Historical  
melatonin 5 mg cap capsule Take 5 mg by mouth nightly. Provider, Historical  
albuterol sulfate (PROVENTIL;VENTOLIN) 2.5 mg/0.5 mL nebu nebulizer solution by Nebulization route two (2) times a day. Provider, Historical  
glucose blood VI test strips (ACCU-CHEK JUSTICE PLUS TEST STRP) strip Test 3 times daily Dx Code 250.00 4/29/15   Anderson Garcia MD  
ACCU-CHEK JUSTICE PLUS METER misc  3/12/15   Provider, Historical  
levothyroxine (SYNTHROID) 100 mcg tablet Take 100 mcg by mouth Daily (before breakfast). 2/16/15   Provider, Historical  
insulin syringe-needle U-100 (BD INSULIN SYRINGE ULTRA-FINE) 1 mL 31 x 15/64\" syrg 1 Syringe by SubCUTAneous route two (2) times a day. 4/8/15   Anderson Garcia MD  
Lancets (ACCU-CHEK SOFTCLIX LANCETS) misc Test 4 times daily. Dx code 250.00 4/8/15   Anderson Garcia MD  
  
 
Allergies Allergen Reactions  Lisinopril Cough Review of Systems: 
Review of Systems Constitutional: Negative. HENT: Negative. Eyes: Negative. Respiratory: Positive for shortness of breath. Cardiovascular:  
     Reports chest discomfort Gastrointestinal: Positive for heartburn. Genitourinary: Negative. Musculoskeletal: Negative. Skin: Negative. Neurological: Negative. Endo/Heme/Allergies: Negative. Objective:  
 
Vitals:  
 12/31/20 6722 12/31/20 7936 BP:  123/75 Pulse: (!) 148 Resp: 20 Temp: 98.2 °F (36.8 °C) SpO2: 96% Weight: 104.3 kg (230 lb) Height: 5' 2\" (1.575 m) Physical Exam: 
Physical Exam 
Vitals signs and nursing note reviewed. Constitutional:   
   Appearance: Normal appearance. She is obese. HENT:  
   Head: Normocephalic. Nose: Nose normal.  
   Mouth/Throat:  
   Mouth: Mucous membranes are moist.  
Eyes:  
   Extraocular Movements: Extraocular movements intact. Neck: Musculoskeletal: Normal range of motion and neck supple. Cardiovascular:  
   Rate and Rhythm: Tachycardia present. Rhythm irregular. Pulses: Normal pulses. Heart sounds: Normal heart sounds. Comments: Afib 158 on monitor Pulmonary:  
   Effort: Pulmonary effort is normal.  
   Comments: 2L NC Abdominal:  
   General: Bowel sounds are normal.  
   Palpations: Abdomen is soft. Musculoskeletal: Normal range of motion. Skin: 
   General: Skin is warm and dry. Capillary Refill: Capillary refill takes less than 2 seconds. Neurological:  
   Mental Status: She is alert and oriented to person, place, and time. Psychiatric:     
   Mood and Affect: Mood normal.  
 
  
 
Assessment: Afib RVR: 
-managed with eliquis, BB outpatient 
-start amiodarone gtt 
-consult cardiology Acute kidney Injury: 
-continue to monitor, monitor in setting of diuretic usage 
-hold entresto Hypokalemia: 
-replenish with 40meq Type 2 DM: 
-ac/hs accu check 
-med dose ssi, continue metformin HX CHF: 
-continue cardioprotective medications: eliquis, asa, statin, metoprolol, ranexa 
-entresto held in setting latanya Morbid obesity: 
-patient counseled on dietary and lifestyle modifications GERD: 
-symptomatic, start IV protonix Plan:  
Inpatient admission, amiodarone gtt Consult cardiology Resume home cardioprotective medications Repeat labs in am 
Patient counseled on dietary and lifestyle modifications Full Code Protonix GI PROPHYLAXIS Eliquis DVT PROPHYLAXIS Home medications reviewed and reconciled Above treatment plan reviewed and discussed with patient in detail at bedside, all questions answered. Signed By: David Cardenas NP December 31, 2020

## 2021-01-01 ENCOUNTER — ANESTHESIA (OUTPATIENT)
Dept: CARDIOLOGY UNIT | Age: 71
DRG: 284 | End: 2021-01-01
Payer: MEDICARE

## 2021-01-01 ENCOUNTER — ANESTHESIA EVENT (OUTPATIENT)
Dept: CARDIOLOGY UNIT | Age: 71
DRG: 284 | End: 2021-01-01
Payer: MEDICARE

## 2021-01-01 VITALS
HEIGHT: 62 IN | TEMPERATURE: 97.5 F | BODY MASS INDEX: 41.33 KG/M2 | OXYGEN SATURATION: 97 % | SYSTOLIC BLOOD PRESSURE: 90 MMHG | DIASTOLIC BLOOD PRESSURE: 46 MMHG | RESPIRATION RATE: 18 BRPM | WEIGHT: 224.6 LBS

## 2021-01-01 LAB
ALBUMIN SERPL-MCNC: 3.5 G/DL (ref 3.5–5)
ALBUMIN/GLOB SERPL: 0.9 {RATIO} (ref 1.1–2.2)
ALP SERPL-CCNC: 46 U/L (ref 45–117)
ALT SERPL-CCNC: 321 U/L (ref 12–78)
ANION GAP SERPL CALC-SCNC: 9 MMOL/L (ref 5–15)
AST SERPL W P-5'-P-CCNC: 256 U/L (ref 15–37)
ATRIAL RATE: 104 BPM
BASOPHILS # BLD: 0 K/UL (ref 0–0.1)
BASOPHILS NFR BLD: 0 % (ref 0–1)
BILIRUB SERPL-MCNC: 0.4 MG/DL (ref 0.2–1)
BUN SERPL-MCNC: 61 MG/DL (ref 6–20)
BUN/CREAT SERPL: 24 (ref 12–20)
CA-I BLD-MCNC: 9.4 MG/DL (ref 8.5–10.1)
CALCULATED R AXIS, ECG10: 174 DEGREES
CALCULATED T AXIS, ECG11: 168 DEGREES
CHLORIDE SERPL-SCNC: 94 MMOL/L (ref 97–108)
CO2 SERPL-SCNC: 29 MMOL/L (ref 21–32)
CREAT SERPL-MCNC: 2.5 MG/DL (ref 0.55–1.02)
DIAGNOSIS, 93000: NORMAL
DIFFERENTIAL METHOD BLD: ABNORMAL
EOSINOPHIL # BLD: 0 K/UL (ref 0–0.4)
EOSINOPHIL NFR BLD: 0 % (ref 0–7)
ERYTHROCYTE [DISTWIDTH] IN BLOOD BY AUTOMATED COUNT: 14.6 % (ref 11.5–14.5)
EST. AVERAGE GLUCOSE BLD GHB EST-MCNC: 177 MG/DL
GLOBULIN SER CALC-MCNC: 3.9 G/DL (ref 2–4)
GLUCOSE BLD STRIP.AUTO-MCNC: 174 MG/DL (ref 65–100)
GLUCOSE BLD STRIP.AUTO-MCNC: 197 MG/DL (ref 65–100)
GLUCOSE BLD STRIP.AUTO-MCNC: 222 MG/DL (ref 65–100)
GLUCOSE BLD STRIP.AUTO-MCNC: 262 MG/DL (ref 65–100)
GLUCOSE BLD STRIP.AUTO-MCNC: 285 MG/DL (ref 65–100)
GLUCOSE BLD STRIP.AUTO-MCNC: 288 MG/DL (ref 65–100)
GLUCOSE BLD STRIP.AUTO-MCNC: 299 MG/DL (ref 65–100)
GLUCOSE BLD STRIP.AUTO-MCNC: 410 MG/DL (ref 65–100)
GLUCOSE BLD STRIP.AUTO-MCNC: 419 MG/DL (ref 65–100)
GLUCOSE BLD STRIP.AUTO-MCNC: 84 MG/DL (ref 65–100)
GLUCOSE SERPL-MCNC: 179 MG/DL (ref 65–100)
HBA1C MFR BLD: 7.8 % (ref 4–5.6)
HCT VFR BLD AUTO: 34.4 % (ref 35–47)
HGB BLD-MCNC: 10.6 G/DL (ref 11.5–16)
IMM GRANULOCYTES # BLD AUTO: 0.1 K/UL (ref 0–0.04)
IMM GRANULOCYTES NFR BLD AUTO: 1 % (ref 0–0.5)
LYMPHOCYTES # BLD: 1.7 K/UL (ref 0.8–3.5)
LYMPHOCYTES NFR BLD: 14 % (ref 12–49)
MCH RBC QN AUTO: 29 PG (ref 26–34)
MCHC RBC AUTO-ENTMCNC: 30.8 G/DL (ref 30–36.5)
MCV RBC AUTO: 94 FL (ref 80–99)
MONOCYTES # BLD: 0.8 K/UL (ref 0–1)
MONOCYTES NFR BLD: 6 % (ref 5–13)
NEUTS SEG # BLD: 10.1 K/UL (ref 1.8–8)
NEUTS SEG NFR BLD: 79 % (ref 32–75)
PERFORMED BY, TECHID: ABNORMAL
PERFORMED BY, TECHID: NORMAL
PLATELET # BLD AUTO: 271 K/UL (ref 150–400)
PMV BLD AUTO: 10.3 FL (ref 8.9–12.9)
POTASSIUM SERPL-SCNC: 4.8 MMOL/L (ref 3.5–5.1)
PROT SERPL-MCNC: 7.4 G/DL (ref 6.4–8.2)
Q-T INTERVAL, ECG07: 468 MS
QRS DURATION, ECG06: 114 MS
QTC CALCULATION (BEZET), ECG08: 609 MS
RBC # BLD AUTO: 3.66 M/UL (ref 3.8–5.2)
SODIUM SERPL-SCNC: 132 MMOL/L (ref 136–145)
VENTRICULAR RATE, ECG03: 102 BPM
WBC # BLD AUTO: 12.6 K/UL (ref 3.6–11)

## 2021-01-01 PROCEDURE — 74011250636 HC RX REV CODE- 250/636: Performed by: INTERNAL MEDICINE

## 2021-01-01 PROCEDURE — C9113 INJ PANTOPRAZOLE SODIUM, VIA: HCPCS | Performed by: NURSE PRACTITIONER

## 2021-01-01 PROCEDURE — 74011000250 HC RX REV CODE- 250: Performed by: INTERNAL MEDICINE

## 2021-01-01 PROCEDURE — 74011250637 HC RX REV CODE- 250/637: Performed by: INTERNAL MEDICINE

## 2021-01-01 PROCEDURE — 74011250636 HC RX REV CODE- 250/636: Performed by: NURSE PRACTITIONER

## 2021-01-01 PROCEDURE — 74011250637 HC RX REV CODE- 250/637: Performed by: NURSE PRACTITIONER

## 2021-01-01 PROCEDURE — 85025 COMPLETE CBC W/AUTO DIFF WBC: CPT

## 2021-01-01 PROCEDURE — 74011250637 HC RX REV CODE- 250/637: Performed by: PHYSICIAN ASSISTANT

## 2021-01-01 PROCEDURE — 36415 COLL VENOUS BLD VENIPUNCTURE: CPT

## 2021-01-01 PROCEDURE — 74011000258 HC RX REV CODE- 258: Performed by: INTERNAL MEDICINE

## 2021-01-01 PROCEDURE — 74011636637 HC RX REV CODE- 636/637: Performed by: NURSE PRACTITIONER

## 2021-01-01 PROCEDURE — 74011636637 HC RX REV CODE- 636/637: Performed by: INTERNAL MEDICINE

## 2021-01-01 PROCEDURE — 74011250637 HC RX REV CODE- 250/637

## 2021-01-01 PROCEDURE — 74011000258 HC RX REV CODE- 258: Performed by: NURSE PRACTITIONER

## 2021-01-01 PROCEDURE — 74011250636 HC RX REV CODE- 250/636: Performed by: PHYSICIAN ASSISTANT

## 2021-01-01 PROCEDURE — 65270000032 HC RM SEMIPRIVATE

## 2021-01-01 PROCEDURE — 0BH17EZ INSERTION OF ENDOTRACHEAL AIRWAY INTO TRACHEA, VIA NATURAL OR ARTIFICIAL OPENING: ICD-10-PCS | Performed by: ANESTHESIOLOGY

## 2021-01-01 PROCEDURE — 74011250637 HC RX REV CODE- 250/637: Performed by: FAMILY MEDICINE

## 2021-01-01 PROCEDURE — 74011000250 HC RX REV CODE- 250: Performed by: NURSE PRACTITIONER

## 2021-01-01 PROCEDURE — 93005 ELECTROCARDIOGRAM TRACING: CPT

## 2021-01-01 PROCEDURE — 80053 COMPREHEN METABOLIC PANEL: CPT

## 2021-01-01 PROCEDURE — 82962 GLUCOSE BLOOD TEST: CPT

## 2021-01-01 RX ORDER — POTASSIUM CHLORIDE 20 MEQ/1
40 TABLET, EXTENDED RELEASE ORAL ONCE
Status: COMPLETED | OUTPATIENT
Start: 2021-01-01 | End: 2021-01-01

## 2021-01-01 RX ORDER — FUROSEMIDE 10 MG/ML
40 INJECTION INTRAMUSCULAR; INTRAVENOUS ONCE
Status: COMPLETED | OUTPATIENT
Start: 2021-01-01 | End: 2021-01-01

## 2021-01-01 RX ORDER — FENTANYL CITRATE 50 UG/ML
INJECTION, SOLUTION INTRAMUSCULAR; INTRAVENOUS
Status: DISCONTINUED
Start: 2021-01-01 | End: 2021-01-01 | Stop reason: WASHOUT

## 2021-01-01 RX ORDER — POTASSIUM CHLORIDE 20 MEQ/1
40 TABLET, EXTENDED RELEASE ORAL
Status: COMPLETED | OUTPATIENT
Start: 2021-01-01 | End: 2021-01-01

## 2021-01-01 RX ORDER — HYDROXYZINE PAMOATE 25 MG/1
25 CAPSULE ORAL
Status: DISCONTINUED | OUTPATIENT
Start: 2021-01-01 | End: 2021-01-01 | Stop reason: HOSPADM

## 2021-01-01 RX ORDER — FAMOTIDINE 20 MG/1
40 TABLET, FILM COATED ORAL ONCE
Status: COMPLETED | OUTPATIENT
Start: 2021-01-01 | End: 2021-01-01

## 2021-01-01 RX ORDER — ASPIRIN 325 MG
325 TABLET ORAL ONCE
Status: DISCONTINUED | OUTPATIENT
Start: 2021-01-01 | End: 2021-01-01 | Stop reason: HOSPADM

## 2021-01-01 RX ORDER — MIDAZOLAM HYDROCHLORIDE 1 MG/ML
2 INJECTION, SOLUTION INTRAMUSCULAR; INTRAVENOUS ONCE
Status: DISCONTINUED | OUTPATIENT
Start: 2021-01-01 | End: 2021-01-01 | Stop reason: HOSPADM

## 2021-01-01 RX ORDER — LIDOCAINE HCL/PF 100 MG/5ML
SYRINGE (ML) INTRAVENOUS
Status: DISCONTINUED
Start: 2021-01-01 | End: 2021-01-01 | Stop reason: WASHOUT

## 2021-01-01 RX ORDER — METOPROLOL SUCCINATE 25 MG/1
37.5 TABLET, EXTENDED RELEASE ORAL DAILY
Status: DISCONTINUED | OUTPATIENT
Start: 2021-01-01 | End: 2021-01-01 | Stop reason: HOSPADM

## 2021-01-01 RX ORDER — DILTIAZEM HCL/D5W 125 MG/125
5 PLASTIC BAG, INJECTION (ML) INTRAVENOUS CONTINUOUS
Status: DISCONTINUED | OUTPATIENT
Start: 2021-01-01 | End: 2021-01-01

## 2021-01-01 RX ORDER — GUAIFENESIN 100 MG/5ML
LIQUID (ML) ORAL
Status: COMPLETED
Start: 2021-01-01 | End: 2021-01-01

## 2021-01-01 RX ORDER — INSULIN LISPRO 100 [IU]/ML
7 INJECTION, SOLUTION INTRAVENOUS; SUBCUTANEOUS
Status: DISCONTINUED | OUTPATIENT
Start: 2021-01-01 | End: 2021-01-01 | Stop reason: HOSPADM

## 2021-01-01 RX ORDER — FENTANYL CITRATE 50 UG/ML
50 INJECTION, SOLUTION INTRAMUSCULAR; INTRAVENOUS ONCE
Status: DISCONTINUED | OUTPATIENT
Start: 2021-01-01 | End: 2021-01-01 | Stop reason: HOSPADM

## 2021-01-01 RX ORDER — PROPOFOL 10 MG/ML
INJECTION, EMULSION INTRAVENOUS
Status: DISCONTINUED
Start: 2021-01-01 | End: 2021-01-01 | Stop reason: HOSPADM

## 2021-01-01 RX ORDER — SODIUM BICARBONATE 1 MEQ/ML
SYRINGE (ML) INTRAVENOUS
Status: DISCONTINUED | OUTPATIENT
Start: 2021-01-01 | End: 2021-01-01 | Stop reason: HOSPADM

## 2021-01-01 RX ORDER — ETOMIDATE 2 MG/ML
INJECTION INTRAVENOUS
Status: DISCONTINUED
Start: 2021-01-01 | End: 2021-01-01 | Stop reason: WASHOUT

## 2021-01-01 RX ORDER — METOPROLOL SUCCINATE 25 MG/1
12.5 TABLET, EXTENDED RELEASE ORAL ONCE
Status: COMPLETED | OUTPATIENT
Start: 2021-01-01 | End: 2021-01-01

## 2021-01-01 RX ORDER — MAGNESIUM SULFATE 1 G/100ML
1 INJECTION INTRAVENOUS ONCE
Status: COMPLETED | OUTPATIENT
Start: 2021-01-01 | End: 2021-01-01

## 2021-01-01 RX ORDER — ROCURONIUM BROMIDE 10 MG/ML
INJECTION, SOLUTION INTRAVENOUS
Status: DISCONTINUED
Start: 2021-01-01 | End: 2021-01-01 | Stop reason: WASHOUT

## 2021-01-01 RX ORDER — EPINEPHRINE 0.1 MG/ML
INJECTION INTRACARDIAC; INTRAVENOUS
Status: DISCONTINUED | OUTPATIENT
Start: 2021-01-01 | End: 2021-01-01 | Stop reason: HOSPADM

## 2021-01-01 RX ORDER — MIDODRINE HYDROCHLORIDE 5 MG/1
5 TABLET ORAL
Status: DISCONTINUED | OUTPATIENT
Start: 2021-01-01 | End: 2021-01-01 | Stop reason: HOSPADM

## 2021-01-01 RX ADMIN — APIXABAN 5 MG: 5 TABLET, FILM COATED ORAL at 21:54

## 2021-01-01 RX ADMIN — ATORVASTATIN CALCIUM 10 MG: 10 TABLET, FILM COATED ORAL at 10:04

## 2021-01-01 RX ADMIN — MELATONIN TAB 5 MG 5 MG: 5 TAB at 21:54

## 2021-01-01 RX ADMIN — MIDODRINE HYDROCHLORIDE 5 MG: 5 TABLET ORAL at 12:46

## 2021-01-01 RX ADMIN — GABAPENTIN 100 MG: 100 CAPSULE ORAL at 09:14

## 2021-01-01 RX ADMIN — ATORVASTATIN CALCIUM 10 MG: 10 TABLET, FILM COATED ORAL at 09:12

## 2021-01-01 RX ADMIN — LEVOTHYROXINE SODIUM 100 MCG: 0.1 TABLET ORAL at 10:05

## 2021-01-01 RX ADMIN — INSULIN LISPRO 5 UNITS: 100 INJECTION, SOLUTION INTRAVENOUS; SUBCUTANEOUS at 17:50

## 2021-01-01 RX ADMIN — ASPIRIN 81 MG 324 MG: 81 TABLET ORAL at 23:00

## 2021-01-01 RX ADMIN — FUROSEMIDE 40 MG: 10 INJECTION, SOLUTION INTRAMUSCULAR; INTRAVENOUS at 22:09

## 2021-01-01 RX ADMIN — SPIRONOLACTONE 12.5 MG: 25 TABLET ORAL at 09:19

## 2021-01-01 RX ADMIN — INSULIN HUMAN 34 UNITS: 100 INJECTION, SUSPENSION SUBCUTANEOUS at 17:49

## 2021-01-01 RX ADMIN — INSULIN LISPRO 3 UNITS: 100 INJECTION, SOLUTION INTRAVENOUS; SUBCUTANEOUS at 21:54

## 2021-01-01 RX ADMIN — Medication 2 TABLET: at 10:05

## 2021-01-01 RX ADMIN — Medication 5 MG/HR: at 12:59

## 2021-01-01 RX ADMIN — GABAPENTIN 100 MG: 100 CAPSULE ORAL at 17:58

## 2021-01-01 RX ADMIN — MIDODRINE HYDROCHLORIDE 5 MG: 5 TABLET ORAL at 17:49

## 2021-01-01 RX ADMIN — VITAM B12 100 MCG: 100 TAB at 09:14

## 2021-01-01 RX ADMIN — APIXABAN 5 MG: 5 TABLET, FILM COATED ORAL at 22:08

## 2021-01-01 RX ADMIN — EPINEPHRINE 1 MG: 0.1 INJECTION, SOLUTION ENDOTRACHEAL; INTRACARDIAC; INTRAVENOUS at 00:21

## 2021-01-01 RX ADMIN — PANTOPRAZOLE SODIUM 40 MG: 40 INJECTION, POWDER, FOR SOLUTION INTRAVENOUS at 10:12

## 2021-01-01 RX ADMIN — PROMETHAZINE HYDROCHLORIDE 12.5 MG: 25 TABLET ORAL at 13:14

## 2021-01-01 RX ADMIN — ASPIRIN 81 MG: 81 TABLET, COATED ORAL at 09:12

## 2021-01-01 RX ADMIN — Medication 400 MG: at 09:14

## 2021-01-01 RX ADMIN — METFORMIN HYDROCHLORIDE 1000 MG: 500 TABLET ORAL at 10:05

## 2021-01-01 RX ADMIN — AMIODARONE HYDROCHLORIDE 1 MG/MIN: 50 INJECTION, SOLUTION INTRAVENOUS at 16:35

## 2021-01-01 RX ADMIN — POTASSIUM CHLORIDE 40 MEQ: 1500 TABLET, EXTENDED RELEASE ORAL at 22:08

## 2021-01-01 RX ADMIN — GABAPENTIN 100 MG: 100 CAPSULE ORAL at 22:08

## 2021-01-01 RX ADMIN — Medication 10 ML: at 12:54

## 2021-01-01 RX ADMIN — APIXABAN 5 MG: 5 TABLET, FILM COATED ORAL at 10:05

## 2021-01-01 RX ADMIN — INSULIN HUMAN 44 UNITS: 100 INJECTION, SUSPENSION SUBCUTANEOUS at 09:11

## 2021-01-01 RX ADMIN — MIDODRINE HYDROCHLORIDE 5 MG: 5 TABLET ORAL at 10:05

## 2021-01-01 RX ADMIN — METOPROLOL SUCCINATE 12.5 MG: 25 TABLET, EXTENDED RELEASE ORAL at 12:46

## 2021-01-01 RX ADMIN — SERTRALINE 100 MG: 50 TABLET, FILM COATED ORAL at 09:12

## 2021-01-01 RX ADMIN — METOPROLOL SUCCINATE 25 MG: 25 TABLET, EXTENDED RELEASE ORAL at 10:05

## 2021-01-01 RX ADMIN — INSULIN LISPRO 2 UNITS: 100 INJECTION, SOLUTION INTRAVENOUS; SUBCUTANEOUS at 12:49

## 2021-01-01 RX ADMIN — LEVOTHYROXINE SODIUM 100 MCG: 0.1 TABLET ORAL at 09:12

## 2021-01-01 RX ADMIN — INSULIN HUMAN 44 UNITS: 100 INJECTION, SUSPENSION SUBCUTANEOUS at 10:06

## 2021-01-01 RX ADMIN — MELATONIN TAB 5 MG 5 MG: 5 TAB at 22:09

## 2021-01-01 RX ADMIN — INSULIN LISPRO 7 UNITS: 100 INJECTION, SOLUTION INTRAVENOUS; SUBCUTANEOUS at 13:15

## 2021-01-01 RX ADMIN — PANTOPRAZOLE SODIUM 40 MG: 40 INJECTION, POWDER, FOR SOLUTION INTRAVENOUS at 09:17

## 2021-01-01 RX ADMIN — METFORMIN HYDROCHLORIDE 1000 MG: 500 TABLET ORAL at 09:12

## 2021-01-01 RX ADMIN — INSULIN LISPRO 7 UNITS: 100 INJECTION, SOLUTION INTRAVENOUS; SUBCUTANEOUS at 12:00

## 2021-01-01 RX ADMIN — RANOLAZINE 500 MG: 500 TABLET, FILM COATED, EXTENDED RELEASE ORAL at 09:12

## 2021-01-01 RX ADMIN — POTASSIUM CHLORIDE 40 MEQ: 1500 TABLET, EXTENDED RELEASE ORAL at 10:04

## 2021-01-01 RX ADMIN — MAGNESIUM SULFATE HEPTAHYDRATE 1 G: 1 INJECTION, SOLUTION INTRAVENOUS at 00:00

## 2021-01-01 RX ADMIN — Medication 2 TABLET: at 09:12

## 2021-01-01 RX ADMIN — SPIRONOLACTONE 12.5 MG: 25 TABLET ORAL at 10:05

## 2021-01-01 RX ADMIN — POTASSIUM CHLORIDE 40 MEQ: 1500 TABLET, EXTENDED RELEASE ORAL at 12:46

## 2021-01-01 RX ADMIN — GABAPENTIN 100 MG: 100 CAPSULE ORAL at 10:04

## 2021-01-01 RX ADMIN — GABAPENTIN 100 MG: 100 CAPSULE ORAL at 21:54

## 2021-01-01 RX ADMIN — APIXABAN 5 MG: 5 TABLET, FILM COATED ORAL at 09:12

## 2021-01-01 RX ADMIN — Medication 10 ML: at 06:00

## 2021-01-01 RX ADMIN — INSULIN LISPRO 7 UNITS: 100 INJECTION, SOLUTION INTRAVENOUS; SUBCUTANEOUS at 09:11

## 2021-01-01 RX ADMIN — METOPROLOL SUCCINATE 25 MG: 25 TABLET, EXTENDED RELEASE ORAL at 09:19

## 2021-01-01 RX ADMIN — Medication 10 ML: at 01:31

## 2021-01-01 RX ADMIN — ICOSAPENT ETHYL 2 G: 1000 CAPSULE ORAL at 09:00

## 2021-01-01 RX ADMIN — AMIODARONE HYDROCHLORIDE 1 MG/MIN: 50 INJECTION, SOLUTION INTRAVENOUS at 03:28

## 2021-01-01 RX ADMIN — METFORMIN HYDROCHLORIDE 1000 MG: 500 TABLET ORAL at 16:48

## 2021-01-01 RX ADMIN — ONDANSETRON 4 MG: 2 INJECTION INTRAMUSCULAR; INTRAVENOUS at 21:54

## 2021-01-01 RX ADMIN — RANOLAZINE 500 MG: 500 TABLET, FILM COATED, EXTENDED RELEASE ORAL at 10:05

## 2021-01-01 RX ADMIN — METFORMIN HYDROCHLORIDE 1000 MG: 500 TABLET ORAL at 17:49

## 2021-01-01 RX ADMIN — POLYETHYLENE GLYCOL 3350 17 G: 17 POWDER, FOR SOLUTION ORAL at 12:47

## 2021-01-01 RX ADMIN — Medication 10 ML: at 05:55

## 2021-01-01 RX ADMIN — GABAPENTIN 100 MG: 100 CAPSULE ORAL at 17:00

## 2021-01-01 RX ADMIN — EPINEPHRINE 1 MG: 0.1 INJECTION, SOLUTION ENDOTRACHEAL; INTRACARDIAC; INTRAVENOUS at 00:24

## 2021-01-01 RX ADMIN — ONDANSETRON 4 MG: 2 INJECTION INTRAMUSCULAR; INTRAVENOUS at 12:54

## 2021-01-01 RX ADMIN — ASPIRIN 81 MG: 81 TABLET, COATED ORAL at 10:04

## 2021-01-01 RX ADMIN — VITAM B12 100 MCG: 100 TAB at 10:05

## 2021-01-01 RX ADMIN — FAMOTIDINE 40 MG: 20 TABLET ORAL at 22:38

## 2021-01-01 RX ADMIN — MIDODRINE HYDROCHLORIDE 5 MG: 5 TABLET ORAL at 16:34

## 2021-01-01 RX ADMIN — INSULIN LISPRO 2 UNITS: 100 INJECTION, SOLUTION INTRAVENOUS; SUBCUTANEOUS at 16:36

## 2021-01-01 RX ADMIN — POLYETHYLENE GLYCOL 3350 17 G: 17 POWDER, FOR SOLUTION ORAL at 22:07

## 2021-01-01 RX ADMIN — INSULIN LISPRO 3 UNITS: 100 INJECTION, SOLUTION INTRAVENOUS; SUBCUTANEOUS at 22:08

## 2021-01-01 RX ADMIN — MIDODRINE HYDROCHLORIDE 5 MG: 5 TABLET ORAL at 13:14

## 2021-01-01 RX ADMIN — SERTRALINE 100 MG: 50 TABLET, FILM COATED ORAL at 10:05

## 2021-01-01 RX ADMIN — INSULIN LISPRO 7 UNITS: 100 INJECTION, SOLUTION INTRAVENOUS; SUBCUTANEOUS at 13:14

## 2021-01-01 RX ADMIN — INSULIN LISPRO 7 UNITS: 100 INJECTION, SOLUTION INTRAVENOUS; SUBCUTANEOUS at 17:49

## 2021-01-01 RX ADMIN — SODIUM BICARBONATE 50 MEQ: 84 INJECTION INTRAVENOUS at 00:26

## 2021-01-01 RX ADMIN — Medication 10 ML: at 13:15

## 2021-01-01 RX ADMIN — Medication 400 MG: at 10:04

## 2021-01-01 NOTE — ROUTINE PROCESS
Bedside shift change report given to Shyla Syed (oncoming nurse) by Carmela Huddleston (offgoing nurse). Report included the following information SBAR, MAR, Recent Results and Cardiac Rhythm A fib.

## 2021-01-01 NOTE — CONSULTS
[facility] CARDIOLOGY CONSULTATION 
 
REASON FOR CONSULT:  Atrial fibrillation REQUESTING PROVIDER:  Hospitalist 
 
CHIEF COMPLAINT:  Chest pain HISTORY OF PRESENT ILLNESS:  This is a 78yo female with history of atrial fibrillation, COPD, congestive heart failure, diabetes, hypertension, and hypothyroidism, who presented to the hospital yesterday with chest pain. She initially took her PPI therapy, with no resolution, which prompted her to come in. She denies any palpitations. Some mild shortness of breath. No aggravating factors. In the hospital, she was noted to have atrial fibrillation with rapid ventricular response. She was started on amidoarone gtt, but this did not improve rates significantly. Since admission, patient has had an improvement in her symptoms. Chest pain has resolved. PAST MEDICAL HISTORY:  Noted above. No recent testing. HOME MEDICATIONS:  Home medications reviewed, no side effects. ALLERGIES:  Allergies reviewed with the patient, she is allergic to lisinopril. FAMILY HISTORY:  Family history reviewed, several family members with premature cardiac disease. SOCIAL HISTORY:  Notable for no tobacco use, no heavy alcohol or illicit drug use. REVIEW OF SYSTEMS:  Complete review of systems performed, pertinents noted above, all other systems are negative. PHYSICAL EXAMINATION:  Vital sign assessment reveal a blood pressure of 144/78 and pulse rate of 120. Cardiovascular exam has a heart with a tachycardic irregularly irregular rate and rhythm, normal S1 and S2. Soft systolic murmur is present. There are no rubs or gallops. Good peripheral pulses. No jugular venous distension. No carotid bruits are present. Respiratory exam reveals clear lung fields, no rales or rhonchi. Gastrointestinal exam has soft, nontender abdomen with normal bowel sounds. Lymphatic exam reveals no edema and no varicosities. No notable skin changes. Neurologic exam is nonfocal.  Musculoskeletal exam is notable for a normal gait. Recent labs results and imaging reviewed. Notable findings include negative troponin level. IMPRESSION AND RECOMMENDATIONS: 
1. Atrial fibrillation:  Rates remains elevated. Agree with IV diltiazem. We will stop amiodarone. Continue anticoagution. 2. Congestive heart failure:  Volume status is stable. Continue current therapy. 3. Hypertension:  Blood pressure is acceptable, continue current therapy. Thank you for involving us in the care of this patient. Please do not hesitate to call if additional questions arise.

## 2021-01-01 NOTE — PROGRESS NOTES
Hospitalist Progress Note ROLANDO Garnica, CHELSIC Daily Progress Note: 1/1/2021 Subjective:  
Subjective Patient seen on f/u alert and oriented sitting on bedside No complaints at this time No acute distress noted Review of Systems:  
Review of Systems Constitutional: Negative. HENT: Negative. Eyes: Negative. Respiratory: Negative. Cardiovascular: Negative. Gastrointestinal: Negative. Genitourinary: Negative. Musculoskeletal: Negative. Skin: Negative. Neurological: Negative. Endo/Heme/Allergies: Negative. Psychiatric/Behavioral: Negative. Objective:  
Objective Vitals: 
Patient Vitals for the past 12 hrs: 
 Temp Pulse Resp BP SpO2  
01/01/21 1111 97.6 °F (36.4 °C) (!) 105 20 (!) 92/55 96 % 01/01/21 0919    107/70   
01/01/21 0723 97.4 °F (36.3 °C) (!) 108 20 (!) 97/57 99 % Physical Exam: 
Physical Exam 
Vitals signs reviewed. Constitutional:   
   Appearance: She is obese. HENT:  
   Head: Normocephalic. Nose: Nose normal.  
   Mouth/Throat:  
   Mouth: Mucous membranes are moist.  
Cardiovascular:  
   Rate and Rhythm: Tachycardia present. Rhythm irregular. Pulses: Normal pulses. Pulmonary:  
   Effort: Pulmonary effort is normal.  
   Breath sounds: Normal breath sounds. Abdominal:  
   General: Bowel sounds are normal.  
Musculoskeletal: Normal range of motion. Skin: 
   General: Skin is warm and dry. Capillary Refill: Capillary refill takes less than 2 seconds. Neurological:  
   Mental Status: She is alert and oriented to person, place, and time. Psychiatric:     
   Mood and Affect: Mood normal.     
   Behavior: Behavior normal.  
 
  
 
Lab Results: 
Recent Results (from the past 24 hour(s)) GLUCOSE, POC Collection Time: 12/31/20  3:31 PM  
Result Value Ref Range Glucose (POC) 493 (H) 65 - 100 mg/dL  Performed by STEPHEN Castillo  
 Collection Time: 12/31/20  9:13 PM  
Result Value Ref Range Glucose (POC) 453 (H) 65 - 100 mg/dL Performed by Merlinda Box, POC Collection Time: 01/01/21  7:28 AM  
Result Value Ref Range Glucose (POC) 410 (H) 65 - 100 mg/dL Performed by Sylvie Bloch, POC Collection Time: 01/01/21 11:14 AM  
Result Value Ref Range Glucose (POC) 419 (H) 65 - 100 mg/dL Performed by Kathrin Mayfield Diagnostic Images: CT Results  (Last 48 hours) None Current Medications: 
 
Current Facility-Administered Medications:  
  dilTIAZem (CARDIZEM) 125 mg/125 mL (1 mg/mL) dextrose 5% infusion, 5 mg/hr, IntraVENous, CONTINUOUS, Marisela Nickerson NP 
  sodium chloride (NS) flush 5-40 mL, 5-40 mL, IntraVENous, Q8H, Marisela Nickerson NP, 10 mL at 01/01/21 0600 
  sodium chloride (NS) flush 5-40 mL, 5-40 mL, IntraVENous, PRN, Thierry Ngo NP, 10 mL at 01/01/21 8121   acetaminophen (TYLENOL) tablet 650 mg, 650 mg, Oral, Q6H PRN **OR** acetaminophen (TYLENOL) suppository 650 mg, 650 mg, Rectal, Q6H PRN, Thierry Ngo NP 
  polyethylene glycol (MIRALAX) packet 17 g, 17 g, Oral, DAILY PRN, Thierry Ngo NP 
  promethazine (PHENERGAN) tablet 12.5 mg, 12.5 mg, Oral, Q6H PRN **OR** ondansetron (ZOFRAN) injection 4 mg, 4 mg, IntraVENous, Q6H PRN, Thierry Ngo NP 
  apixaban (ELIQUIS) tablet 5 mg, 5 mg, Oral, BID, Thierry Ngo NP, 5 mg at 01/01/21 6079   aspirin delayed-release tablet 81 mg, 81 mg, Oral, DAILY, Thierry Ngo NP, 81 mg at 01/01/21 5091   atorvastatin (LIPITOR) tablet 10 mg, 10 mg, Oral, DAILY, Thierryoscar Ngo NP, 10 mg at 01/01/21 6469   cholecalciferol (VITAMIN D3) (1000 Units /25 mcg) tablet 2 Tab, 50 mcg, Oral, DAILY, Thierry Ngo NP, 2 Tab at 01/01/21 5462   cyanocobalamin (VITAMIN B12) tablet 100 mcg, 100 mcg, Oral, DAILY, Thierry Ngo NP, 100 mcg at 01/01/21 6017   [Held by provider] sacubitriL-valsartan (ENTRESTO) 24-26 mg tablet 1 Tab, 1 Tab, Oral, Q12H, Marisela Nickerson NP, Stopped at 01/01/21 0900 
  gabapentin (NEURONTIN) capsule 100 mg, 100 mg, Oral, TID, Elizebeth Deb, NP, 100 mg at 01/01/21 4743   icosapent ethyL (VASCEPA) capsule 2 g, 2 g, Oral, Q12H, Marisela Nickerson NP, 2 g at 01/01/21 0900   levothyroxine (SYNTHROID) tablet 100 mcg, 100 mcg, Oral, ACB, Elizebedaisy Boyd, NP, 100 mcg at 01/01/21 8782   magnesium oxide (MAG-OX) tablet 400 mg, 400 mg, Oral, DAILY, Marisela Nickerson NP, 400 mg at 01/01/21 0914 
  melatonin tablet 5 mg, 5 mg, Oral, QHS, Rodrigo Boyd NP 
  metFORMIN (GLUCOPHAGE) tablet 1,000 mg, 1,000 mg, Oral, BID WITH MEALS, Rodrigo Boyd, ABHNIAV, 1,000 mg at 01/01/21 0445   metoprolol succinate (TOPROL-XL) XL tablet 25 mg, 25 mg, Oral, DAILY, Marisela Nickerson NP, 25 mg at 01/01/21 3369   sertraline (ZOLOFT) tablet 100 mg, 100 mg, Oral, DAILY, Elizebedaisy Boyd, NP, 100 mg at 01/01/21 3824   spironolactone (ALDACTONE) tablet 12.5 mg, 12.5 mg, Oral, DAILY, Marisela Nickerson NP, 12.5 mg at 01/01/21 0919 
  ranolazine ER (RANEXA) tablet 500 mg, 500 mg, Oral, DAILY, Elizebedaisy Boyd, NP, 500 mg at 01/01/21 0912 
  glucose chewable tablet 16 g, 4 Tab, Oral, PRN, Rodrigo Boyd, NP 
  dextrose (D50W) injection syrg 12.5-25 g, 25-50 mL, IntraVENous, PRN, Elilathabedaisy Boyd NP 
  glucagon (GLUCAGEN) injection 1 mg, 1 mg, IntraMUSCular, PRN, Rodrigo Boyd NP 
  insulin lispro (HUMALOG) injection, , SubCUTAneous, AC&HS, Rodrigo Boyd NP, 7 Units at 01/01/21 0911 
  pantoprazole (PROTONIX) 40 mg in 0.9% sodium chloride 10 mL injection, 40 mg, IntraVENous, DAILY, Marisela Nickerson NP, 40 mg at 01/01/21 0917 
  insulin NPH/insulin regular (NOVOLIN 70/30, HUMULIN 70/30) injection 34 Units, 34 Units, SubCUTAneous, DAILY WITH DINNER, Tamala Dakins, MD, 34 Units at 12/31/20 1815   insulin NPH/insulin regular (NOVOLIN 70/30, HUMULIN 70/30) injection 44 Units, 44 Units, SubCUTAneous, DAILY WITH BREAKFAST, Katarina Ty MD, 44 Units at 01/01/21 3422 ASSESSMENT: 
Afib RVR: 
-managed with eliquis, BB outpatient 
-s/p amiodarone gtt without improvement in rate 
-d/c amiodarone and transition to IV cardizem 
-cardiology input appreciated 
  
Acute kidney Injury: 
-continue to monitor, monitor in setting of diuretic usage 
-hold entresto 
   
Hypokalemia: 
-replenished 
  
Type 2 DM: 
-uncontrolled, ac/hs accu check 
-med dose ssi, continue metformin 
-add lispro 7u tid, obtain A1c 
  
HX CHF: 
-continue cardioprotective medications: eliquis, asa, statin, metoprolol, ranexa 
-entresto held in setting latanya 
  
Morbid obesity: 
-patient counseled on dietary and lifestyle modifications 
  
GERD: 
-symptomatic, continue IV protonix PLAN: 
-d/c amiodarone and transition to IV cardizem 
-cardiology input appreciated 
-add lispro 7u tid, obtain A1c 
-continue cardioprotective medications: eliquis, asa, statin, metoprolol, ranexa Full Code Dvt Prophylaxis GI Prophylaxis Home medications reviewed and reconciled Above treatment plan reviewed and discussed with patient in detail at bedside, all questions answered. Care Plan discussed with: interdisciplinary team 
 
Total time spent with patient: 30 minutes.  
 
Natalie Jones NP

## 2021-01-02 NOTE — PROGRESS NOTES
Cardiology Progress Note Patient seen and examined. This is a patient who is followed for AF and chronic diastolic CHF. Complains of fatigue but no chest pain or shortness of breath. No other complaints reported. Telemetry reviewed, there were no events noted in the past 24 hours. Pertinent review of system items noted above, all other systems are negative. Current medications reviewed. Physical Examination Vital signs are stable, Blood Pressure 107/70 , Pulse  No apparent distress. Heart is regular, rate and rhythm. Normal S1, S2, no murmurs are appreciated. Lungs are clear bilaterally. Abdomen is soft, nontender, normal bowel sounds. Extremities have no edema. Labs reviewed Impression and Recommendations 1. AF with RVR: Will start amiodarone drip to convert to NSR. Continue eliquis. D/W patient, she does not recall any serious adverse events with amio. Will dc dilt drip and hold BB. 2. HFPEF: Euvolemic per recent mems readings. Continue home meds. Not on ACE due to low bp. 
3. Hypertension: Changes in meds per above. Please do no hesitate to call if additional questions arise.

## 2021-01-02 NOTE — CONSULTS
Consult Patient: Lucia Cummings MRN: [id number]  SSN: [id number] YOB: 1950  Age: 69 y.o. Sex: female Subjective:  
  
Lucia Cummings is a 69 y.o. female who is being seen for . abdominal pain She was in the hospital for 2 days due to Afib RVR around 150s. EKG on admission shows Atrial fibrillation with rapid ventricular response with premature ventricular or aberrantly conducted complexes. Patient has been treated for atrial fibrillation rapid ventricular rate, congestive heart failure, please has been given potassium treatment, patient complained of the mid abdominal pain, severe heartburn, no evidence of cardiac disease ischemia, some nausea, abdominal bloating, no blood in stool, 
Past Medical History:  
Diagnosis Date  Atrial fibrillation ([address]  Cataract   
 left eye  Chronic obstructive pulmonary disease ([address]  Congestive heart failure ([address]  Diabetes ([address]  Essential hypertension  YENNIFER on CPAP  Thyroid disease Past Surgical History:  
Procedure Laterality Date  HX  SECTION    
 HX CHOLECYSTECTOMY  HX CORONARY ARTERY BYPASS GRAFT   Triple, MCV  
 HX HERNIA REPAIR Family History Problem Relation Age of Onset  Hypertension Other  Diabetes Mother  Hypertension Father  Hypertension Paternal Aunt  Diabetes Maternal Grandmother  Hypertension Paternal Grandmother Social History Tobacco Use  Smoking status: Never Smoker  Smokeless tobacco: Never Used Substance Use Topics  Alcohol use: No  
  
Current Facility-Administered Medications Medication Dose Route Frequency Provider Last Rate Last Admin  dilTIAZem (CARDIZEM) 125 mg/125 mL (1 mg/mL) dextrose 5% infusion  5 mg/hr IntraVENous CONTINUOUS Sylvester Cart, NP 5 mL/hr at 21 1259 5 mg/hr at 21 1259  insulin lispro (HUMALOG) injection 7 Units  7 Units SubCUTAneous Nirmala Genao, NP   7 Units at 01/01/21 1749  
 midodrine (PROAMATINE) tablet 5 mg  5 mg Oral TID WITH MEALS Salima Cousins, NP   5 mg at 01/01/21 1749  hydrOXYzine pamoate (VISTARIL) capsule 25 mg  25 mg Oral TID PRN Salima Cousins, NP      
 potassium chloride (K-DUR, KLOR-CON) SR tablet 40 mEq  40 mEq Oral NOW Rc Benítez PA-C      
 [START ON 1/2/2021] potassium chloride (K-DUR, KLOR-CON) SR tablet 40 mEq  40 mEq Oral ONCE Rc Benítez PA-C      
 furosemide (LASIX) injection 40 mg  40 mg IntraVENous ONCE Oc Benítez PA-C      
 sodium chloride (NS) flush 5-40 mL  5-40 mL IntraVENous Q8H Salima Cousins, NP   10 mL at 01/01/21 1315  sodium chloride (NS) flush 5-40 mL  5-40 mL IntraVENous PRN Salima Cousins, NP   10 mL at 01/01/21 0555  acetaminophen (TYLENOL) tablet 650 mg  650 mg Oral Q6H PRN Salima Cousins, NP Or  
 acetaminophen (TYLENOL) suppository 650 mg  650 mg Rectal Q6H PRN Salima Cousins, NP      
 polyethylene glycol (MIRALAX) packet 17 g  17 g Oral DAILY PRN Salima Cousins, NP      
 promethazine (PHENERGAN) tablet 12.5 mg  12.5 mg Oral Q6H PRN Salima Cousins, NP   12.5 mg at 01/01/21 1314 Or  
 ondansetron (ZOFRAN) injection 4 mg  4 mg IntraVENous Q6H PRN Salima Cousins, NP      
 apixaban (ELIQUIS) tablet 5 mg  5 mg Oral BID Salima Cousins, NP   5 mg at 01/01/21 5283  aspirin delayed-release tablet 81 mg  81 mg Oral DAILY Salima Cousins, NP   81 mg at 01/01/21 1573  atorvastatin (LIPITOR) tablet 10 mg  10 mg Oral DAILY Salima Cousins, NP   10 mg at 01/01/21 4238  cholecalciferol (VITAMIN D3) (1000 Units /25 mcg) tablet 2 Tab  50 mcg Oral DAILY Salima Cousins, NP   2 Tab at 01/01/21 9006  cyanocobalamin (VITAMIN B12) tablet 100 mcg  100 mcg Oral DAILY Salima Cousins, NP   100 mcg at 01/01/21 2308  [Held by provider] sacubitriL-valsartan (ENTRESTO) 24-26 mg tablet 1 Tab  1 Tab Oral Q12H Keisha Brito, NP   Stopped at 01/01/21 0900  
 gabapentin (NEURONTIN) capsule 100 mg  100 mg Oral TID Keisha Brito, NP   100 mg at 01/01/21 1758  icosapent ethyL (VASCEPA) capsule 2 g  2 g Oral Q12H Keisha Brito, NP   2 g at 01/01/21 0900  levothyroxine (SYNTHROID) tablet 100 mcg  100 mcg Oral ACB Keisha Brito, NP   100 mcg at 01/01/21 3194  magnesium oxide (MAG-OX) tablet 400 mg  400 mg Oral DAILY Keisha Brito, NP   400 mg at 01/01/21 0914  
 melatonin tablet 5 mg  5 mg Oral QHS Keisha Brito, NP      
 metFORMIN (GLUCOPHAGE) tablet 1,000 mg  1,000 mg Oral BID WITH MEALS Keisha Brito, NP   1,000 mg at 01/01/21 1749  
 metoprolol succinate (TOPROL-XL) XL tablet 25 mg  25 mg Oral DAILY Keisha Brito, NP   25 mg at 01/01/21 9953  sertraline (ZOLOFT) tablet 100 mg  100 mg Oral DAILY Keisha Brito, NP   100 mg at 01/01/21 6941  spironolactone (ALDACTONE) tablet 12.5 mg  12.5 mg Oral DAILY Keisha Brito, NP   12.5 mg at 01/01/21 0919  
 ranolazine ER (RANEXA) tablet 500 mg  500 mg Oral DAILY Keisha Brito, NP   500 mg at 01/01/21 0912  
 glucose chewable tablet 16 g  4 Tab Oral PRN Keisha Brito, NP      
 dextrose (D50W) injection syrg 12.5-25 g  25-50 mL IntraVENous PRN Keisha Brito, NP      
 glucagon (GLUCAGEN) injection 1 mg  1 mg IntraMUSCular PRN Keisha Brito, NP      
 insulin lispro (HUMALOG) injection   SubCUTAneous AC&HS Keisha Brito, NP   5 Units at 01/01/21 1750  pantoprazole (PROTONIX) 40 mg in 0.9% sodium chloride 10 mL injection  40 mg IntraVENous DAILY Keisha Brito, NP   40 mg at 01/01/21 0917  
 insulin NPH/insulin regular (NOVOLIN 70/30, HUMULIN 70/30) injection 34 Units  34 Units SubCUTAneous DAILY WITH DINNER Diana Rodriguez MD   34 Units at 01/01/21 3116  insulin NPH/insulin regular (NOVOLIN 70/30, HUMULIN 70/30) injection 44 Units  44 Units SubCUTAneous DAILY WITH BREAKFAST Trinh Rudd MD   44 Units at 01/01/21 4370 Allergies Allergen Reactions  Lisinopril Cough Review of Systems: 
Review of Systems Constitutional: Positive for malaise/fatigue and weight loss. HENT: Negative for hearing loss. Eyes: Negative. Respiratory: Positive for shortness of breath. Negative for cough and hemoptysis. Cardiovascular: Positive for chest pain, palpitations and orthopnea. Gastrointestinal: Positive for abdominal pain, constipation, heartburn and nausea. Genitourinary: Positive for dysuria. Musculoskeletal: Positive for myalgias. Skin: Negative. Neurological: Positive for focal weakness. Endo/Heme/Allergies: Negative. Psychiatric/Behavioral: Negative. Objective:  
 
Vitals:  
 01/01/21 1436 01/01/21 1600 01/01/21 1748 01/01/21 2004 BP: 97/66   (!) 89/48 Pulse: 94 81  61 Resp:    20 Temp: 98 °F (36.7 °C)   97.3 °F (36.3 °C) SpO2: 97%   98% Weight:   101.9 kg (224 lb 9.6 oz) Height:      
  
 
Physical Exam: 
Physical Exam  
Constitutional: She is oriented to person, place, and time. She appears distressed. HENT:  
Head: Atraumatic. Eyes: Pupils are equal, round, and reactive to light. Conjunctivae and EOM are normal. Left eye exhibits discharge. Neck: No JVD present. No tracheal deviation present. No thyromegaly present. Cardiovascular: Normal heart sounds. An irregularly irregular rhythm present. Pulmonary/Chest: No apnea and no bradypnea. No breast tenderness or bleeding. Abdominal: Soft. She exhibits no distension, no fluid wave, no ascites and no mass. Neurological: She is alert and oriented to person, place, and time. No cranial nerve deficit or sensory deficit. Recent Results (from the past 24 hour(s)) GLUCOSE, POC  Collection Time: 01/01/21  7:28 AM  
 Result Value Ref Range Glucose (POC) 410 (H) 65 - 100 mg/dL Performed by Tahir Negro, POC Collection Time: 01/01/21 11:14 AM  
Result Value Ref Range Glucose (POC) 419 (H) 65 - 100 mg/dL Performed by Tahir Negro, POC Collection Time: 01/01/21  4:17 PM  
Result Value Ref Range Glucose (POC) 288 (H) 65 - 100 mg/dL Performed by GABE REYES   
GLUCOSE, POC Collection Time: 01/01/21  8:10 PM  
Result Value Ref Range Glucose (POC) 285 (H) 65 - 100 mg/dL Performed by Lilliana Zeng   
  
 
XR CHEST SNGL V Final Result IMPRESSION: No change in prior median sternotomy for CABG and cardiomegaly. Increasing interstitial infiltration which has the appearance of progressive  
pulmonary edema. Please see full report. Assessment:  
 
Hospital Problems  Date Reviewed: 2/27/2019 Codes Class Noted POA Chest discomfort ICD-10-CM: R07.89 ICD-9-CM: 786.59  12/31/2020 Unknown * (Principal) A-fib [facility]) ICD-10-CM: I48.91 
ICD-9-CM: 427.31  12/31/2020 Unknown  
   
  
heartburn, long  history, no dysphagia, Abdominal pain, bowel changes 
,high risk for ischemia bowel, No GI bleed Morbid obesity, 
Plan:  
Continued ventricular rate control, followed by cardiologist 
PPI  Iv as ordered She had some renal insufficiency, cannot do IV conscious CTs, not able to do MRI, due to the obesity 
we will schedule patient with abdominal ultrasound to rexam the liver, gallbladder, 
 
Signed By: Taco Drummond MD   
 January 1, 2021 Thank you for allowing me to participate in this patients care Cc Referring Physician  
Jailyn Melgar DO

## 2021-01-02 NOTE — ROUTINE PROCESS
Bedside shift change report given to Brad Lazaro (oncoming nurse) by John Simons (offgoing nurse). Report included the following information SBAR, Intake/Output, MAR, Recent Results and Cardiac Rhythm a fib.

## 2021-01-02 NOTE — PROGRESS NOTES
Progress Note Patient: Reji Oneil MRN: [id number]  SSN: [id number] YOB: 1950  Age: 69 y.o. Sex: female Admit Date: 12/31/2020 LOS: 2 days Subjective:  
Patient examined, has epigastric pain, heartburn, nausea, no vomiting, 
Past Medical History:  
Diagnosis Date  Atrial fibrillation ([facility]  Cataract   
 left eye  Chronic obstructive pulmonary disease ([facility]  Congestive heart failure ([facility]  Diabetes ([facility]  Essential hypertension  YENNIFER on CPAP  Thyroid disease Current Facility-Administered Medications:  
  [Held by provider] metoprolol succinate (TOPROL-XL) XL tablet 37.5 mg, 37.5 mg, Oral, DAILY, Mirela Mccurdy MD 
  amiodarone (CORDARONE) 375 mg/250 ml D5W infusion, 0.5-1 mg/min, IntraVENous, TITRATE, Mirela Mccurdy MD 
  insulin lispro (HUMALOG) injection 7 Units, 7 Units, SubCUTAneous, TIDAC, Mannie Awad NP, 7 Units at 01/02/21 1200 
  midodrine (PROAMATINE) tablet 5 mg, 5 mg, Oral, TID WITH MEALS, Mannie Awad NP, 5 mg at 01/02/21 1246   hydrOXYzine pamoate (VISTARIL) capsule 25 mg, 25 mg, Oral, TID PRN, Mannie Awad NP 
  sodium chloride (NS) flush 5-40 mL, 5-40 mL, IntraVENous, Q8H, Marisela Nickerson NP, 10 mL at 01/02/21 1254   sodium chloride (NS) flush 5-40 mL, 5-40 mL, IntraVENous, PRN, Mannie Awad NP, 10 mL at 01/01/21 5698   acetaminophen (TYLENOL) tablet 650 mg, 650 mg, Oral, Q6H PRN **OR** acetaminophen (TYLENOL) suppository 650 mg, 650 mg, Rectal, Q6H PRN, Mannie Awad NP 
  polyethylene glycol (MIRALAX) packet 17 g, 17 g, Oral, DAILY PRN, Mannie Awad NP, 17 g at 01/02/21 1247   promethazine (PHENERGAN) tablet 12.5 mg, 12.5 mg, Oral, Q6H PRN, 12.5 mg at 01/01/21 1314 **OR** ondansetron (ZOFRAN) injection 4 mg, 4 mg, IntraVENous, Q6H PRN, Mannie Awad NP, 4 mg at 01/02/21 1254   apixaban (ELIQUIS) tablet 5 mg, 5 mg, Oral, BID, Mannie Awad NP, 5 mg at 01/02/21 1005   aspirin delayed-release tablet 81 mg, 81 mg, Oral, DAILY, Cleveland Cárdenas NP, 81 mg at 01/02/21 1004   atorvastatin (LIPITOR) tablet 10 mg, 10 mg, Oral, DAILY, Marisela Nickerson NP, 10 mg at 01/02/21 1004   cholecalciferol (VITAMIN D3) (1000 Units /25 mcg) tablet 2 Tab, 50 mcg, Oral, DAILY, Cleveland Cárdenas NP, 2 Tab at 01/02/21 1005   cyanocobalamin (VITAMIN B12) tablet 100 mcg, 100 mcg, Oral, DAILY, Cleveland Cárdenas NP, 100 mcg at 01/02/21 1005   [Held by provider] sacubitriL-valsartan (ENTRESTO) 24-26 mg tablet 1 Tab, 1 Tab, Oral, Q12H, Marisela Nickerson NP, Stopped at 01/01/21 0900 
  gabapentin (NEURONTIN) capsule 100 mg, 100 mg, Oral, TID, Cleveland Cárdenas NP, 100 mg at 01/02/21 1004   icosapent ethyL (VASCEPA) capsule 2 g, 2 g, Oral, Q12H, Marisela Nickerson NP, 2 g at 01/02/21 0900   levothyroxine (SYNTHROID) tablet 100 mcg, 100 mcg, Oral, ACB, Marisela Nickerson NP, 100 mcg at 01/02/21 1005   magnesium oxide (MAG-OX) tablet 400 mg, 400 mg, Oral, DAILY, Marisela Nickerson NP, 400 mg at 01/02/21 1004   melatonin tablet 5 mg, 5 mg, Oral, QHS, Bugg, Marylene Spice, NP, 5 mg at 01/01/21 2209   metFORMIN (GLUCOPHAGE) tablet 1,000 mg, 1,000 mg, Oral, BID WITH MEALS, Cleveland Cárdenas NP, 1,000 mg at 01/02/21 1005   sertraline (ZOLOFT) tablet 100 mg, 100 mg, Oral, DAILY, Marisela Nickerson NP, 100 mg at 01/02/21 1005   spironolactone (ALDACTONE) tablet 12.5 mg, 12.5 mg, Oral, DAILY, Marisela Nickerson NP, 12.5 mg at 01/02/21 1005   ranolazine ER (RANEXA) tablet 500 mg, 500 mg, Oral, DAILY, Marisela Nickerson NP, 500 mg at 01/02/21 1005 
  glucose chewable tablet 16 g, 4 Tab, Oral, PRN, Cleveland Cárdenas NP 
  dextrose (D50W) injection syrg 12.5-25 g, 25-50 mL, IntraVENous, PRN, Cleveland Cárdenas NP 
  glucagon (GLUCAGEN) injection 1 mg, 1 mg, IntraMUSCular, PRN, Cleveland Cárdenas NP 
  insulin lispro (HUMALOG) injection, , SubCUTAneous, AC&HS, Cleveland Cárdenas NP, 2 Units at 01/02/21 1249   pantoprazole (PROTONIX) 40 mg in 0.9% sodium chloride 10 mL injection, 40 mg, IntraVENous, DAILY, Marisela Nickerson NP, 40 mg at 01/02/21 1012   insulin NPH/insulin regular (NOVOLIN 70/30, HUMULIN 70/30) injection 34 Units, 34 Units, SubCUTAneous, DAILY WITH DINNER, Katey Cummings MD, 34 Units at 01/01/21 1749 
  insulin NPH/insulin regular (NOVOLIN 70/30, HUMULIN 70/30) injection 44 Units, 44 Units, SubCUTAneous, DAILY WITH BREAKFAST, Katey Cummings MD, 44 Units at 01/02/21 1006 Objective:  
 
Vitals:  
 01/02/21 0800 01/02/21 1002 01/02/21 1129 01/02/21 1200 BP:  117/70 104/75 Pulse: (!) 102 (!) 118 (!) 102 (!) 108 Resp:   19 Temp:   97.8 °F (36.6 °C) SpO2:   100% Weight:      
Height:      
  
 
Intake and Output: 
Current Shift: No intake/output data recorded. Last three shifts: No intake/output data recorded. Physical Exam:  
Physical Exam  
Constitutional: She is oriented to person, place, and time. She appears distressed. HENT:  
Head: Atraumatic. Eyes: Pupils are equal, round, and reactive to light. Conjunctivae are normal.  
Neck: Normal range of motion. Neck supple. Cardiovascular: Normal heart sounds. Pulmonary/Chest: Effort normal and breath sounds normal.  
Abdominal: She exhibits no distension. There is abdominal tenderness. There is no rebound. Musculoskeletal:     
   General: No tenderness or edema. Neurological: She is oriented to person, place, and time. Skin: Skin is warm. Rash noted. Psychiatric: She has a normal mood and affect. Lab/Data Review: 
Recent Results (from the past 24 hour(s)) EKG, 12 LEAD, INITIAL Collection Time: 01/01/21  1:55 PM  
Result Value Ref Range Ventricular Rate 102 BPM  
 Atrial Rate 104 BPM  
 QRS Duration 114 ms Q-T Interval 468 ms QTC Calculation (Bezet) 609 ms Calculated R Axis 174 degrees Calculated T Axis 168 degrees Diagnosis Atrial fibrillation with rapid ventricular response Right bundle branch block T wave abnormality, consider lateral ischemia Abnormal ECG When compared with ECG of 31-DEC-2020 10:09, 
Right bundle branch block is now Present Confirmed by [facility] Anna JOHNSON [phone]) on 1/1/2021 11:53:22 PM 
  
GLUCOSE, POC Collection Time: 01/01/21  4:17 PM  
Result Value Ref Range Glucose (POC) 288 (H) 65 - 100 mg/dL Performed by GABE REYES   
GLUCOSE, POC Collection Time: 01/01/21  8:10 PM  
Result Value Ref Range Glucose (POC) 285 (H) 65 - 100 mg/dL Performed by Anthony Williamson   
GLUCOSE, POC Collection Time: 01/02/21  8:29 AM  
Result Value Ref Range Glucose (POC) 84 65 - 100 mg/dL Performed by Dewayne Frias CBC WITH AUTOMATED DIFF Collection Time: 01/02/21 10:55 AM  
Result Value Ref Range WBC 12.6 (H) 3.6 - 11.0 K/uL  
 RBC 3.66 (L) 3.80 - 5.20 M/uL  
 HGB 10.6 (L) 11.5 - 16.0 g/dL HCT 34.4 (L) 35.0 - 47.0 % MCV 94.0 80.0 - 99.0 FL  
 MCH 29.0 26.0 - 34.0 PG  
 MCHC 30.8 30.0 - 36.5 g/dL  
 RDW 14.6 (H) 11.5 - 14.5 % PLATELET 249 120 - 493 K/uL MPV 10.3 8.9 - 12.9 FL  
 NEUTROPHILS 79 (H) 32 - 75 % LYMPHOCYTES 14 12 - 49 % MONOCYTES 6 5 - 13 % EOSINOPHILS 0 0 - 7 % BASOPHILS 0 0 - 1 % IMMATURE GRANULOCYTES 1 (H) 0.0 - 0.5 % ABS. NEUTROPHILS 10.1 (H) 1.8 - 8.0 K/UL  
 ABS. LYMPHOCYTES 1.7 0.8 - 3.5 K/UL  
 ABS. MONOCYTES 0.8 0.0 - 1.0 K/UL  
 ABS. EOSINOPHILS 0.0 0.0 - 0.4 K/UL  
 ABS. BASOPHILS 0.0 0.0 - 0.1 K/UL  
 ABS. IMM. GRANS. 0.1 (H) 0.00 - 0.04 K/UL  
 DF AUTOMATED METABOLIC PANEL, COMPREHENSIVE Collection Time: 01/02/21 10:55 AM  
Result Value Ref Range Sodium 132 (L) 136 - 145 mmol/L Potassium 4.8 3.5 - 5.1 mmol/L Chloride 94 (L) 97 - 108 mmol/L  
 CO2 29 21 - 32 mmol/L Anion gap 9 5 - 15 mmol/L Glucose 179 (H) 65 - 100 mg/dL BUN 61 (H) 6 - 20 mg/dL Creatinine 2.50 (H) 0.55 - 1.02 mg/dL BUN/Creatinine ratio 24 (H) 12 - 20 GFR est AA 23 (L) >60 ml/min/1.73m2 GFR est non-AA 19 (L) >60 ml/min/1.73m2 Calcium 9.4 8.5 - 10.1 mg/dL Bilirubin, total 0.4 0.2 - 1.0 mg/dL AST (SGOT) 256 (H) 15 - 37 U/L  
 ALT (SGPT) 321 (H) 12 - 78 U/L Alk. phosphatase 46 45 - 117 U/L Protein, total 7.4 6.4 - 8.2 g/dL Albumin 3.5 3.5 - 5.0 g/dL Globulin 3.9 2.0 - 4.0 g/dL A-G Ratio 0.9 (L) 1.1 - 2.2 GLUCOSE, POC Collection Time: 01/02/21 11:21 AM  
Result Value Ref Range Glucose (POC) 197 (H) 65 - 100 mg/dL Performed by Denny Perryman XR CHEST SNGL V Final Result IMPRESSION: No change in prior median sternotomy for CABG and cardiomegaly. Increasing interstitial infiltration which has the appearance of progressive  
pulmonary edema. Please see full report.  [employer]    (Results Pending) Assessment:  
 
Principal Problem: 
  A-fib ([address] (12/31/2020) Active Problems: 
  Chest discomfort (12/31/2020) heartburn, long  history, no dysphagia, Abdominal pain, nausea, heartburn,bowel changes 
high risk for ischemia bowel, No GI bleed Morbid obesity, 
Plan:  
Continued ventricular rate control, followed by cardiologist 
PPI  Iv as ordered She had some renal insufficiency, cannot do IV conscious CTs, not able to do MRI, due to the obesity 
we will schedule patient with abdominal ultrasound, Should symptoms persist, should abdominal ultrasound is unremarkable, An EGD will be scheduled Plan:  
 
 
Signed By: Kari Mcbride MD   
 January 2, 2021 Thank you for allowing me to participate in this patients care Cc Referring Physician  
Judd Melgar DO

## 2021-01-02 NOTE — PROGRESS NOTES
Problem: Falls - Risk of 
Goal: *Absence of Falls Description: Document Felisa Glakaden Fall Risk and appropriate interventions in the flowsheet. Outcome: Progressing Towards Goal 
Note: Fall Risk Interventions: 
  
 
  
 
Medication Interventions: Patient to call before getting OOB, Teach patient to arise slowly

## 2021-01-02 NOTE — PROGRESS NOTES
Hospitalist Progress Note ROLANDO Tuttle, MARIA DEL ROSARIO-C Daily Progress Note: 1/2/2021 Subjective:  
Subjective Patient seen on f/u alert and oriented sitting on bedside No complaints at this time No acute distress noted Review of Systems:  
Review of Systems Constitutional: Negative. HENT: Negative. Eyes: Negative. Respiratory: Negative. Cardiovascular: Negative. Gastrointestinal: Negative. Genitourinary: Negative. Musculoskeletal: Negative. Skin: Negative. Neurological: Negative. Endo/Heme/Allergies: Negative. Psychiatric/Behavioral: Negative. Objective:  
Objective Vitals: 
Patient Vitals for the past 12 hrs: 
 Temp Pulse Resp BP SpO2  
01/02/21 1129 97.8 °F (36.6 °C) (!) 102 19 104/75 100 % 01/02/21 1002  (!) 118  117/70   
01/02/21 0723 98.1 °F (36.7 °C) (!) 104  99/65 98 % 01/02/21 0430 97 °F (36.1 °C) 98 22 105/68 98 % 01/02/21 0026 97.6 °F (36.4 °C) 95 22 98/62 97 % Physical Exam: 
Physical Exam 
Vitals signs reviewed. Constitutional:   
   Appearance: She is obese. HENT:  
   Head: Normocephalic. Nose: Nose normal.  
   Mouth/Throat:  
   Mouth: Mucous membranes are moist.  
Cardiovascular:  
   Rate and Rhythm: Tachycardia present. Rhythm irregular. Pulses: Normal pulses. Pulmonary:  
   Effort: Pulmonary effort is normal.  
   Breath sounds: Normal breath sounds. Abdominal:  
   General: Bowel sounds are normal.  
Musculoskeletal: Normal range of motion. Right lower leg: Edema present. Left lower leg: Edema present. Skin: 
   General: Skin is warm and dry. Capillary Refill: Capillary refill takes less than 2 seconds. Neurological:  
   Mental Status: She is alert and oriented to person, place, and time. Psychiatric:     
   Mood and Affect: Mood normal.     
   Behavior: Behavior normal.  
 
  
 
Lab Results: 
Recent Results (from the past 24 hour(s)) EKG, 12 LEAD, INITIAL Collection Time: 01/01/21  1:55 PM  
Result Value Ref Range Ventricular Rate 102 BPM  
 Atrial Rate 104 BPM  
 QRS Duration 114 ms Q-T Interval 468 ms QTC Calculation (Bezet) 609 ms Calculated R Axis 174 degrees Calculated T Axis 168 degrees Diagnosis Atrial fibrillation with rapid ventricular response Right bundle branch block T wave abnormality, consider lateral ischemia Abnormal ECG When compared with ECG of 31-DEC-2020 10:09, 
Right bundle branch block is now Present Confirmed by [facility] MILKACecil, Anna Eaton [phone]) on 1/1/2021 11:53:22 PM 
  
GLUCOSE, POC Collection Time: 01/01/21  4:17 PM  
Result Value Ref Range Glucose (POC) 288 (H) 65 - 100 mg/dL Performed by GABE REYES   
GLUCOSE, POC Collection Time: 01/01/21  8:10 PM  
Result Value Ref Range Glucose (POC) 285 (H) 65 - 100 mg/dL Performed by Arturo Elizabeth   
GLUCOSE, POC Collection Time: 01/02/21  8:29 AM  
Result Value Ref Range Glucose (POC) 84 65 - 100 mg/dL Performed by Denny Greenbrier GLUCOSE, POC Collection Time: 01/02/21 11:21 AM  
Result Value Ref Range Glucose (POC) 197 (H) 65 - 100 mg/dL Performed by Denny Oxford Diagnostic Images: CT Results  (Last 48 hours) None Current Medications: 
 
Current Facility-Administered Medications:  
  [START ON 1/3/2021] metoprolol succinate (TOPROL-XL) XL tablet 37.5 mg, 37.5 mg, Oral, DAILY, Rafita Hair MD 
  metoprolol succinate (TOPROL-XL) XL tablet 12.5 mg, 12.5 mg, Oral, ONCE, Donovan Majano MD 
  dilTIAZem (CARDIZEM) 125 mg/125 mL (1 mg/mL) dextrose 5% infusion, 5 mg/hr, IntraVENous, CONTINUOUS, Marisela Nickerson NP, Last Rate: 5 mL/hr at 01/01/21 1259, 5 mg/hr at 01/01/21 1259 
  insulin lispro (HUMALOG) injection 7 Units, 7 Units, SubCUTAneous, TIDAC, Damon Levin NP, Stopped at 01/02/21 0730 
  midodrine (PROAMATINE) tablet 5 mg, 5 mg, Oral, TID WITH MEALS, Damon Levin NP, 5 mg at 01/02/21 1002   hydrOXYzine pamoate (VISTARIL) capsule 25 mg, 25 mg, Oral, TID PRN, Katelyn Dang NP 
  sodium chloride (NS) flush 5-40 mL, 5-40 mL, IntraVENous, Q8H, Marisela Nickerson NP, 10 mL at 01/02/21 0131 
  sodium chloride (NS) flush 5-40 mL, 5-40 mL, IntraVENous, PRN, Katelyn Dang NP, 10 mL at 01/01/21 0828   acetaminophen (TYLENOL) tablet 650 mg, 650 mg, Oral, Q6H PRN **OR** acetaminophen (TYLENOL) suppository 650 mg, 650 mg, Rectal, Q6H PRN, Katelyn Dang NP 
  polyethylene glycol (MIRALAX) packet 17 g, 17 g, Oral, DAILY PRN, Katelyn Dang NP 
  promethazine (PHENERGAN) tablet 12.5 mg, 12.5 mg, Oral, Q6H PRN, 12.5 mg at 01/01/21 1314 **OR** ondansetron (ZOFRAN) injection 4 mg, 4 mg, IntraVENous, Q6H PRN, Katelyn Dang NP 
  apixaban (ELIQUIS) tablet 5 mg, 5 mg, Oral, BID, Katelyn Dang NP, 5 mg at 01/02/21 1005   aspirin delayed-release tablet 81 mg, 81 mg, Oral, DAILY, Katelyn Dang NP, 81 mg at 01/02/21 1004   atorvastatin (LIPITOR) tablet 10 mg, 10 mg, Oral, DAILY, Marisela Nickerson NP, 10 mg at 01/02/21 1004   cholecalciferol (VITAMIN D3) (1000 Units /25 mcg) tablet 2 Tab, 50 mcg, Oral, DAILY, Katelyn Dang NP, 2 Tab at 01/02/21 1005   cyanocobalamin (VITAMIN B12) tablet 100 mcg, 100 mcg, Oral, DAILY, Katelyn Dang NP, 100 mcg at 01/02/21 1005   [Held by provider] sacubitriL-valsartan (ENTRESTO) 24-26 mg tablet 1 Tab, 1 Tab, Oral, Q12H, Marisela Nickerson NP, Stopped at 01/01/21 0900 
  gabapentin (NEURONTIN) capsule 100 mg, 100 mg, Oral, TID, Katelyn Dang NP, 100 mg at 01/02/21 1004   icosapent ethyL (VASCEPA) capsule 2 g, 2 g, Oral, Q12H, Marisela Nickerson NP, 2 g at 01/02/21 0900   levothyroxine (SYNTHROID) tablet 100 mcg, 100 mcg, Oral, ACB, Marisela Nickerson NP, 100 mcg at 01/02/21 1005   magnesium oxide (MAG-OX) tablet 400 mg, 400 mg, Oral, DAILY, Marisela Nickerson NP, 400 mg at 01/02/21 1004 •  melatonin tablet 5 mg, 5 mg, Oral, QHS, Marisela Nickerson NP, 5 mg at 01/01/21 2209 
•  metFORMIN (GLUCOPHAGE) tablet 1,000 mg, 1,000 mg, Oral, BID WITH MEALS, Marisela Nickerson NP, 1,000 mg at 01/02/21 1005 
•  sertraline (ZOLOFT) tablet 100 mg, 100 mg, Oral, DAILY, Marisela Nickerson NP, 100 mg at 01/02/21 1005 
•  spironolactone (ALDACTONE) tablet 12.5 mg, 12.5 mg, Oral, DAILY, Marisela Nikcerson NP, 12.5 mg at 01/02/21 1005 
•  ranolazine ER (RANEXA) tablet 500 mg, 500 mg, Oral, DAILY, Marisela Nickerson NP, 500 mg at 01/02/21 1005 
•  glucose chewable tablet 16 g, 4 Tab, Oral, PRN, Marisela Nickerson NP 
•  dextrose (D50W) injection syrg 12.5-25 g, 25-50 mL, IntraVENous, PRN, Marisela Nickerson NP 
•  glucagon (GLUCAGEN) injection 1 mg, 1 mg, IntraMUSCular, PRN, Marisela Nickerson NP 
•  insulin lispro (HUMALOG) injection, , SubCUTAneous, AC&HS, Marisela Nickerson NP, Stopped at 01/02/21 0730 
•  pantoprazole (PROTONIX) 40 mg in 0.9% sodium chloride 10 mL injection, 40 mg, IntraVENous, DAILY, Marisela Nickerson NP, 40 mg at 01/02/21 1012 
•  insulin NPH/insulin regular (NOVOLIN 70/30, HUMULIN 70/30) injection 34 Units, 34 Units, SubCUTAneous, DAILY WITH DINNER, Dinh Madden MD, 34 Units at 01/01/21 1749 
•  insulin NPH/insulin regular (NOVOLIN 70/30, HUMULIN 70/30) injection 44 Units, 44 Units, SubCUTAneous, DAILY WITH BREAKFAST, Dinh Madden MD, 44 Units at 01/02/21 1006  
 
 
ASSESSMENT: 
Afib RVR: 
-managed with eliquis, BB outpatient 
-s/p amiodarone gtt without improvement in rate 
-continue IV cardizem 
-cardiology following 
  
Acute kidney Injury: 
-continue to monitor, monitor in setting of diuretic usage 
-hold entresto 
   
Hypokalemia: 
-replenished 
  
Type 2 DM: 
-uncontrolled, ac/hs accu check 
-med dose ssi, continue metformin, lispro 7u tid 
-A1c 7.8 
  
HX CHF: 
-continue cardioprotective medications: eliquis, asa, statin, metoprolol, ranexa 
-entresto held in setting latanya 
  
Morbid obesity: 
 -patient counseled on dietary and lifestyle modifications 
  
GERD: 
-symptomatic, continue IV protonix PLAN: 
-Continue IV cardizem 
-cardiology following 
-continue antihyperglycemic medications 
-continue cardioprotective medications: eliquis, asa, statin, metoprolol, ranexa Full Code Dvt Prophylaxis GI Prophylaxis Home medications reviewed and reconciled Above treatment plan reviewed and discussed with patient in detail at bedside, all questions answered.  
 
Care Plan discussed with: interdisciplinary team 
 
Winda Snellen, NP

## 2021-01-03 NOTE — ANESTHESIA PROCEDURE NOTES
Emergent Intubation Performed by: Marcello Stone MD 
Authorized by: Marcello Stone MD  
 
Emergent Intubation: Location:  ICU Date/Time:  1/3/2021 12:05 AM 
Indications:  Respiratory failure Spontaneous Ventilation: absent Level of Consciousness: unresponsive Preoxygenated: Yes Airway Documentation: Airway:  ETT - Cuffed Technique:  Direct laryngoscopy Insertion Site:  Oral 
ETT size (mm):  8.0 ETT Line Callum:  Teeth ETT Insertion depth (cm):  23 Placement verified by: auscultation, EtCO2 and BBS Attempts:  1 Difficult airway: No   
Anesthesia called for urgent/emergent intubation. Patient was identified using two patient identification methods. 100% oxygen via Ambu bag, wall suction, emergency medications, and intubating supplies were readily available. Patient pre-oxygenated with 100% oxygen via Ambu bag. Rapid sequence induction was performed using anesthesia induction agents and muscle relaxants as needed. Patient was intubated with endotracheal tube. End tidal carbon dioxide was confirmed via colorimetry, capnography, bilateral breath sounds, and chest x-ray as needed per the primary team. Patient's vital signs were as expected after induction and/or endotracheal intubation. Sedation recommendations were given to the primary team. The patient tolerated the procedure well and no complications were observed.

## 2021-01-03 NOTE — PROGRESS NOTES
Consult NAME: Jany Dickson :  1950 MRN:  [id number] Date/Time:  1/3/2021 12:10 AM 
 
Patient PCP: Gail Gama DO 
________________________________________________________________________ Problem List:  
-Congestive heart failure 
-Atrial flutter with RVR 
-Hypertension Assessment/Plan:  
-I was called for a STEMI alert when patient start having shortness of breath and showing slow ventricular tachycardia. -I responded and look at the EKG which shows slow VT and there was no evidence of acute ST elevation consistent with injury pattern. 
-Staff nurse from [facility] W. informed me that this is been going on for at least 2 hours. I also talked with Dr. Jose A Landis who is the primary cardiologist on the case and he informed me that patient was short of breath since morning and this is not a new finding for her. 
-I stopped her amiodarone secondary to low blood pressure and normal saline as well secondary to congestion. 
-During this. Patient slow down and her under lying rhythm was atrial flutter with variable block. 
-We will transfer the patient to ICU immediately and discontinue IV normal saline and amiodarone infusion. 
-In the ICU patient started getting tired and fatigued and started having more shortness of breath. 
-Based on this new finding I decided to proceed with cardioversion and asked Versed and fentanyl so I can proceed with the cardioversion. As patient was wide-awake and unable to lie flat I also requested at the same time anesthesiologist to come and proceed with intubation but he was not available secondary to an emergency going on elsewhere. Unfortunately suddenly patient collapsed lost her pulse and we started the ACLS protocol. Unfortunately despite of 30 minutes of resuscitation patient was unable to make it and she passed away. While Dr. Ashkan Raymundo was running the code I did talk to her daughter briefly and explained to her the critical condition of her mother.   
 
 []        High complexity decision making was performed 
 
 
Patient Active Problem List  
Diagnosis Code  
• Chest discomfort R07.89  
• A-fib (HCC) I48.91  
  
 
Subjective:  
CHIEF COMPLAINT:  
 
HISTORY OF PRESENT ILLNESS:    
Natalya is a 70 y.o.   female who was admitted to the hospital with congestive heart failure.  Patient has history of atrial fibrillation with RVR COPD diabetes mellitus hypertension and sleep apnea.  I responded to STEMI alert and saw the patient was diaphoretic having shortness of breath so I discussed the case with primary cardiologist who saw the patient this morning Dr. Aranda.  He was very helpful giving me the information regarding the patient by looking at his chart from his office.  Once transferred to the ICU patient become tired and fatigued and continue to be in ventricular tachycardia with heart rate as high as 126 bpm. 
While waiting to cardiovert I observed that patient is really fatigued and tired and having more shortness of breath with blood pressure of 111 systolic.  Based on this I decided to call anesthesia to intubate her as she was unable to lie flat for me to cardiovert her.  While awaiting for anesthesia and/or respiratory therapist patient lost her pulse and we started resuscitation per ACLS protocol.  Dr. Joslyn rodrigues arrived from the emergency department and took over to run the code. 
Unfortunately after approximately 30 minutes of resuscitation patient was unable to make it and she passed away. 
Past Medical History:  
Diagnosis Date  
• Atrial fibrillation (HCC)   
• Cataract   
 left eye  
• Chronic obstructive pulmonary disease (HCC)   
• Congestive heart failure (HCC)   
• Diabetes (HCC)   
• Essential hypertension   
• YENNIFER on CPAP   
• Thyroid disease   
  
Past Surgical History:  
Procedure Laterality Date  
• HX  SECTION    
• HX CHOLECYSTECTOMY    
• HX CORONARY ARTERY BYPASS GRAFT    
 Triple, MCV  
• HX HERNIA REPAIR    
 
 Allergies Allergen Reactions  Lisinopril Cough Meds:  See below Social History Tobacco Use  Smoking status: Never Smoker  Smokeless tobacco: Never Used Substance Use Topics  Alcohol use: No  
  
Family History Problem Relation Age of Onset  Hypertension Other  Diabetes Mother  Hypertension Father  Hypertension Paternal Aunt  Diabetes Maternal Grandmother  Hypertension Paternal Grandmother REVIEW OF SYSTEMS:   
 []         Unable to obtain  ROS due to --- 
 [x]         Total of 12 systems reviewed as follows: 
 
Constitutional: negative fever, negative chills, negative weight loss Eyes:   negative visual changes ENT:   negative sore throat, tongue or lip swelling Respiratory:  negative cough, negative dyspnea Cards:  negative for chest pain, palpitations, lower extremity edema GI:   negative for nausea, vomiting, diarrhea, and abdominal pain Genitourinary: negative for frequency, dysuria Integument:  negative for rash Hematologic:  negative for easy bruising and gum/nose bleeding Musculoskel: negative for myalgias,  back pain Neurological:  negative for headaches, dizziness, vertigo, weakness Behavl/Psych: negative for feelings of anxiety, depression Pertinent Positives include : 
 
Objective:  
  
Physical Exam: 
 
Last 24hrs VS reviewed since prior progress note. Most recent are: 
 
Visit Vitals BP (!) 90/46 Pulse (!) 114 Temp 97.5 °F (36.4 °C) Resp 18 Ht 5' 2\" (1.575 m) Wt 101.9 kg (224 lb 9.6 oz) SpO2 97% Breastfeeding No  
BMI 41.08 kg/m² No intake or output data in the 24 hours ending 01/03/21 0010 General Appearance: Well developed, well nourished, alert & oriented x 3,  
 no acute distress. Ears/Nose/Mouth/Throat: Pupils equal and round, Hearing grossly normal. 
Neck: Supple. JVP within normal limits. Carotids good upstrokes, with no bruit. Chest: Lungs clear to auscultation bilaterally. Cardiovascular: Regular rate and rhythm, S1S2 normal, no murmur, rubs, gallops. Abdomen: Soft, non-tender, bowel sounds are active. No organomegaly. Extremities: No edema bilaterally. Femoral pulses +2, Distal Pulses +1. Skin: Warm and dry. Neuro: CN II-XII grossly intact, Strength and sensation grossly intact. []         Post-cath site without hematoma, bruit, tenderness, or thrill. Distal pulses intact. XR CHEST SNGL V Final Result IMPRESSION: No change in prior median sternotomy for CABG and cardiomegaly. Increasing interstitial infiltration which has the appearance of progressive  
pulmonary edema. Please see full report.  [employer]    (Results Pending) Data:  
  
Telemetry: 
 
EKG: 
[]  No new EKG for review. Prior to Admission medications Medication Sig Start Date End Date Taking? Authorizing Provider  
cyanocobalamin (Vitamin B-12) 100 mcg tablet Take 100 mcg by mouth daily. Yes Other, MD Tung  
famotidine (PEPCID) 20 mg tablet Take 20 mg by mouth two (2) times a day. Yes Other, MD Tung  
magnesium oxide (MAG-OX) 400 mg tablet Take 400 mg by mouth daily. Yes Other, MD Tung  
cholecalciferol, vitamin D3, (Vitamin D3) 50 mcg (2,000 unit) tab Take 50 mcg by mouth daily. Yes Other, MD Tung  
icosapent ethyL (Vascepa) 0.5 gram cap Take 0.5 g by mouth two (2) times a day. Take 4 capsules twice a day   Yes Other, MD Tung  
atorvastatin (LIPITOR) 10 mg tablet Take 10 mg by mouth daily. Yes Other, MD Tung  
ENTRESTO 24-26 mg tablet Take 1 Tab by mouth. 2/12/19  Yes Provider, Historical  
gabapentin (NEURONTIN) 100 mg capsule Take 100 mg by mouth three (3) times daily. 2/8/19  Yes Provider, Historical  
potassium chloride SR (K-TAB) 20 mEq tablet Take 20 mEq by mouth daily. 2/22/19  Yes Provider, Historical  
apixaban (ELIQUIS) 5 mg tablet Take 5 mg by mouth two (2) times a day.    Yes Provider, Historical  
 metoprolol succinate (TOPROL-XL) 25 mg XL tablet TK 1 T PO D 1/8/19  Yes Provider, Historical  
aspirin delayed-release 81 mg tablet Take  by mouth daily. Yes Provider, Historical  
NOVOLIN 70/30 100 unit/mL (70-30) injection INJECT 60 UNITS SUBCUTANEOUS 20 MINUTES BEFORE BREAKFAST AND 60 UNITS 20 MINUTES BEFORE DINNER Patient taking differently: 44 Units by SubCUTAneous route two (2) times a day. 44units each morning and 34 units each evening 9/12/17  Yes Lucila Bhagat MD  
sertraline (ZOLOFT) 100 mg tablet Take 100 mg by mouth daily. 3/9/15  Yes Provider, Historical  
spironolactone (ALDACTONE) 25 mg tablet Take 12.5 mg by mouth daily. 2/22/15  Yes Provider, Historical  
furosemide (LASIX) 80 mg tablet 80 mg two (2) times a day. Takes 80mg each morning and 40 mg each evening 2/16/15  Yes Provider, Historical  
metFORMIN (GLUCOPHAGE) 1,000 mg tablet Take 1 Tab by mouth two (2) times daily (with meals). 4/8/15  Yes Lucila Bhagat MD  
nitroglycerin (NITROSTAT) 0.4 mg SL tablet 0.4 mg by SubLINGual route every five (5) minutes as needed for Chest Pain. Up to 3 doses. Provider, Historical  
RANEXA 500 mg SR tablet TK 2 TS PO BID 2/16/19   Provider, Historical  
acetaminophen (TYLENOL EXTRA STRENGTH) 500 mg tablet Take  by mouth every six (6) hours as needed for Pain. Provider, Historical  
melatonin 5 mg cap capsule Take 5 mg by mouth nightly. Provider, Historical  
albuterol sulfate (PROVENTIL;VENTOLIN) 2.5 mg/0.5 mL nebu nebulizer solution by Nebulization route two (2) times a day. Provider, Historical  
glucose blood VI test strips (ACCU-CHEK JUSTICE PLUS TEST STRP) strip Test 3 times daily Dx Code 250.00 4/29/15   Lucila Bhagat MD  
ACCU-CHEK JUSTICE PLUS METER misc  3/12/15   Provider, Historical  
levothyroxine (SYNTHROID) 100 mcg tablet Take 100 mcg by mouth Daily (before breakfast).  2/16/15   Provider, Historical  
 insulin syringe-needle U-100 (BD INSULIN SYRINGE ULTRA-FINE) 1 mL 31 x 15/64\" syrg 1 Syringe by SubCUTAneous route two (2) times a day. 4/8/15   Gatito Baxter MD  
Lancets (ACCU-CHEK SOFTCLIX LANCETS) misc Test 4 times daily. Dx code 250.00 4/8/15   Gatito Baxter MD  
 
 
Recent Results (from the past 24 hour(s)) GLUCOSE, POC Collection Time: 01/02/21  8:29 AM  
Result Value Ref Range Glucose (POC) 84 65 - 100 mg/dL Performed by [facility] CBC WITH AUTOMATED DIFF Collection Time: 01/02/21 10:55 AM  
Result Value Ref Range WBC 12.6 (H) 3.6 - 11.0 K/uL  
 RBC 3.66 (L) 3.80 - 5.20 M/uL  
 HGB 10.6 (L) 11.5 - 16.0 g/dL HCT 34.4 (L) 35.0 - 47.0 % MCV 94.0 80.0 - 99.0 FL  
 MCH 29.0 26.0 - 34.0 PG  
 MCHC 30.8 30.0 - 36.5 g/dL  
 RDW 14.6 (H) 11.5 - 14.5 % PLATELET 562 041 - 288 K/uL MPV 10.3 8.9 - 12.9 FL  
 NEUTROPHILS 79 (H) 32 - 75 % LYMPHOCYTES 14 12 - 49 % MONOCYTES 6 5 - 13 % EOSINOPHILS 0 0 - 7 % BASOPHILS 0 0 - 1 % IMMATURE GRANULOCYTES 1 (H) 0.0 - 0.5 % ABS. NEUTROPHILS 10.1 (H) 1.8 - 8.0 K/UL  
 ABS. LYMPHOCYTES 1.7 0.8 - 3.5 K/UL  
 ABS. MONOCYTES 0.8 0.0 - 1.0 K/UL  
 ABS. EOSINOPHILS 0.0 0.0 - 0.4 K/UL  
 ABS. BASOPHILS 0.0 0.0 - 0.1 K/UL  
 ABS. IMM. GRANS. 0.1 (H) 0.00 - 0.04 K/UL  
 DF AUTOMATED METABOLIC PANEL, COMPREHENSIVE Collection Time: 01/02/21 10:55 AM  
Result Value Ref Range Sodium 132 (L) 136 - 145 mmol/L Potassium 4.8 3.5 - 5.1 mmol/L Chloride 94 (L) 97 - 108 mmol/L  
 CO2 29 21 - 32 mmol/L Anion gap 9 5 - 15 mmol/L Glucose 179 (H) 65 - 100 mg/dL BUN 61 (H) 6 - 20 mg/dL Creatinine 2.50 (H) 0.55 - 1.02 mg/dL BUN/Creatinine ratio 24 (H) 12 - 20 GFR est AA 23 (L) >60 ml/min/1.73m2 GFR est non-AA 19 (L) >60 ml/min/1.73m2 Calcium 9.4 8.5 - 10.1 mg/dL Bilirubin, total 0.4 0.2 - 1.0 mg/dL AST (SGOT) 256 (H) 15 - 37 U/L  
 ALT (SGPT) 321 (H) 12 - 78 U/L Alk.  phosphatase 46 45 - 117 U/L  
 Protein, total 7.4 6.4 - 8.2 g/dL Albumin 3.5 3.5 - 5.0 g/dL Globulin 3.9 2.0 - 4.0 g/dL A-G Ratio 0.9 (L) 1.1 - 2.2 GLUCOSE, POC Collection Time: 01/02/21 11:21 AM  
Result Value Ref Range Glucose (POC) 197 (H) 65 - 100 mg/dL Performed by Yelitza Given GLUCOSE, POC Collection Time: 01/02/21  3:06 PM  
Result Value Ref Range Glucose (POC) 174 (H) 65 - 100 mg/dL Performed by Yelitza Saab GLUCOSE, POC Collection Time: 01/02/21  8:39 PM  
Result Value Ref Range Glucose (POC) 222 (H) 65 - 100 mg/dL Performed by June Golden, POC Collection Time: 01/02/21 10:54 PM  
Result Value Ref Range Glucose (POC) 262 (H) 65 - 100 mg/dL Performed by Renee Redd, POC Collection Time: 01/02/21 11:44 PM  
Result Value Ref Range Glucose (POC) 299 (H) 65 - 100 mg/dL Performed by Tammie Lutz MD

## 2021-01-03 NOTE — PROGRESS NOTES
Change in condition: Patient complained of dispepsia, became diaphoretic and clammy and started having runs of [address] @2140. Zofran, miralax and pepcid given without results. EKG was obtained and STEMI called. VS obtained. /62 HR 78, . Dr. Treasure Alexandra was notified and asked to have hospitalist on unit until he arrived 20 minutes later. Patient transferred to ICU . Patient daughter Mateo Dodd called with no answer. Left message to call back.

## 2021-01-03 NOTE — PROGRESS NOTES
Code Blue Note: 
 
Code Blue called at [employer]. CPR initiated. Epi given at [address]. Pulse check at 0011 with no pulse. CPR restarted. Epi given at 0013. Bicarb given at 0014. Pulse check at 0018 with no pulse. CPR restarted. 0018 shock delivered. CPR then restarted. Epi given at [facility]. Pulse check at 0020 with no pulse. CPR restarted. Patient intubated with a 7.5 22 at the gum. ROSC achieved. 0022 pulse check VFIB. CPR restarted. 0022 shock delivered. CPR restarted. 0023 lidocaine given. 0023 CPR restarted. 0024 epi given. 0026 pulse check no pulse. CPR restarted. 6081 epi given. 0029 pulse check no pulse CPR restarted. 0030 epi given. 0031 pulse check no pulse CPR restarted. 0032 Epi given. 0033 pulse check no pulse shock advised and delivered then CPR retstarted. 0035 pulse check no pulse. Time of death is 5.

## 2021-01-03 NOTE — PROGRESS NOTES
Code STEMI was called. Per nursing, patient had wide complex tachycardia on telemetry, complaints of indigestion, chest pressure, nausea/vomiting and diaphoresis. EKG was obtained which read Acute AMI. Nurse called Code STEMI and Dr. Kamini Banks Catherization team was alerted. I was asked to assess patient for acute symptoms. EKG reviewed. I sent a digital copy to Dr. Arabella Doshi through phone. On my examination, patient had complaints of chest pressure, nausea and was diaphoretic. Heart rate is tachycardic, regular rhythm. Blood pressure is low with a MAP of 77. Nursing staff is at bedside obtaining additional peripheral IV lines. I discussed clinical findings with Dr. Arabella Doshi via telephone. Aspirin 325 mg PO was ordered. Chart and recent history was reviewed. I performed 30 minutes of critical care time which does not include time spent on separately billable procedures.

## 2021-01-03 NOTE — PROGRESS NOTES
Assumed Care of Patient from RRT/CODE STEMI from floor RN. Dr. Sarah Schumacher @ bedside. Orders being entered and carried out by fellow Staff members. Pt remains in persistent VTACH vs AFIB RVR VS A FLU. Pt becoming increasingly SOB Diaphoretic and drowsy. Dr Sarah Schumacher deciding on Cardioversion. Ultimately decision made to intubate patient for airway protection in anticipation for Cardioversion. During preparation for intubation pt took big gasp of air and went unresponsive. CPR initiated by this RN, CODE blue called and ACLS protocols initiated. See Notes for full documentation. Bethany Go RN, CCRN

## 2021-01-03 NOTE — PROGRESS NOTES
0770- Call from [facility] pt has not been released and they will be contacting family. Provided them with name of  home. [facility] provided and Pt ID x [phone]. [phone]- Pt transported via [address] [facility], [address]. Bethany Go RN, CCRN

## 2021-01-03 NOTE — PROGRESS NOTES
Code Blue was called (0008 hours) and ACLS protocol was initiated. Please see nursing notes for drugs administered, determined cardiac rhythms and time that shocks were delivered. Epinephrine (multiple doses), bicarbonate, lidocaine and 3 shocks delivered during resuscitation. Despite all efforts, ROSC was not obtained (27 minutes ACLS). Patient was pronounced  at 1. Daughter, Dorina Hamman, was updated over the telephone. Family is on the way to hospital to view patient.  services have been consulted. I discussed case with Dr. Anderson Fox at bedside and Dr. Kandy Meckel via telephone. I performed 40 minutes of critical care time which does not include time spent on separately billable procedures.

## 2021-01-04 NOTE — PROGRESS NOTES
The  visit was made by Associate Teena Antonio. Associate  responded to staff referral and provided grief care to family. This note is being entered by [employer] 1/04/21

## 2021-02-10 NOTE — PROGRESS NOTES
Physician Progress Note Clay Alanis 
CSN #:                  [id number] :                       1950 ADMIT DATE:       2020 9:55 AM 
Marci Mathews DATE:        1/3/2021 12:35 AM 
RESPONDING 
PROVIDER #:        Joey Lopez NP 
 
 
 
 
QUERY TEXTSeveriano Sarai, 
Pt admitted with afib with RVR and has CHF documented. If possible, please document in progress notes and discharge summary further specificity regarding the type and acuity of CHF: 
 
The medical record reflects the following: 
Risk Factors: atrial fibrillation with RVR, Hx of HFpEF on home medications  eliquis, asa, statin, metoprolol, ranexa and Entresto Clinical Indicators: CXR - increased interstitial infiltration which may be due to pulmonary edema, BLE edema Treatment: one time dose 40mg IV Lasix on  Thank you, Dante Evans RN Options provided: 
-- Acute on Chronic Diastolic CHF/HFpEF 
-- Acute on Chronic Systolic and Diastolic CHF 
-- Chronic Diastolic CHF/HFpEF 
-- Other - I will add my own diagnosis -- Disagree - Not applicable / Not valid -- Disagree - Clinically unable to determine / Unknown 
-- Refer to Clinical Documentation Reviewer PROVIDER RESPONSE TEXT: 
 
This patient has chronic diastolic CHF/HFpEF. Query created by:  Vicente Wynne on 2021 12:01 PM 
 
 
Electronically signed by:  Joey Lopez NP 2/10/2021 7:27 AM

## 2021-02-15 NOTE — DISCHARGE SUMMARY
Physician Discharge Summary Patient ID:   
Tanisha Chacon [id number] 
61 y.o. 
1950 Admit date: 12/31/2020 Discharge date : 2/15/2021 Chronic Diagnoses:   
Problem List as of 1/3/2021 Date Reviewed: 2/27/2019 Codes Class Noted - Resolved Chest discomfort ICD-10-CM: R07.89 ICD-9-CM: 786.59  12/31/2020 - Present * (Principal) A-fib [facility]) ICD-10-CM: I48.91 
ICD-9-CM: 427.31  12/31/2020 - Present 25 Final Diagnoses:  
A-fib ([facility] [address] [I48.91] Chest discomfort [R07.89] Reason for Hospitalization: 
 
Chest pain Hospital Course:  
Tanisha Chacon is a 69 y.o. obese  female who presents to the ED with complaint of chest burning and sob, onset yesterday. PMH significant for COPD, GERD, Afib, and CHF. Patient reports chronic respiratory failure with home O2 usage. She reports taking her GERD medication for chest burning; however, did not receive any relief. Therefore she presented to the ED. In the ED patient noted to be in Afib RVR around 150s. EKG on admission shows Atrial fibrillation with rapid ventricular response with premature ventricular or aberrantly conducted complexes. Patient reports her cardiologist is Dr. Sridhar Amaya. Hospitalist consulted for further evaluation. Will admit to inpatient services. Excerpt from Dr. Fior Layne I was called for a STEMI alert when patient start having shortness of breath and showing slow ventricular tachycardia. -I responded and look at the EKG which shows slow VT and there was no evidence of acute ST elevation consistent with injury pattern. 
-Staff nurse from [address] informed me that this is been going on for at least 2 hours.   I also talked with Dr. Sridhar Amaya who is the primary cardiologist on the case and he informed me that patient was short of breath since morning and this is not a new finding for her. 
-I stopped her amiodarone secondary to low blood pressure and normal saline as well secondary to congestion. 
-During this. Patient slow down and her under lying rhythm was atrial flutter with variable block. 
-We will transfer the patient to ICU immediately and discontinue IV normal saline and amiodarone infusion. 
-In the ICU patient started getting tired and fatigued and started having more shortness of breath. 
-Based on this new finding I decided to proceed with cardioversion and asked Versed and fentanyl so I can proceed with the cardioversion. As patient was wide-awake and unable to lie flat I also requested at the same time anesthesiologist to come and proceed with intubation but he was not available secondary to an emergency going on elsewhere. Unfortunately suddenly patient collapsed lost her pulse and we started the ACLS protocol. Unfortunately despite of 30 minutes of resuscitation patient was unable to make it and she passed away. While Dr. Soumya Johnson was running the code I did talk to her daughter briefly and explained to her the critical condition of her mother. 
  
Code Blue was called (0008 hours) and ACLS protocol was initiated. Please see nursing notes for drugs administered, determined cardiac rhythms and time that shocks were delivered. Epinephrine (multiple doses), bicarbonate, lidocaine and 3 shocks delivered during resuscitation. Despite all efforts, ROSC was not obtained (27 minutes ACLS). Patient was pronounced  at 1. Daughter, Inés Arora, was updated over the telephone. Family is on the way to hospital to view patient.  services have been consulted. I discussed case with Dr. Lillian Caba at bedside and Dr. Oliverio Barclay via telephone. Discharge Medications:  
Discharge Medication List as of 1/3/2021  3:26 AM  
  
 
 
 
Follow up Care: 1. Trenton Melgar DO in 1-2 weeks. Please call to set up an appointment shortly after discharge. Diet: none Disposition: 
nonex Advanced Directive:  
FULL   
DNR x Discharge Exam: 
Physical Exam 
Vitals signs reviewed. Constitutional:   
   Appearance: She is obese. HENT:  
   Head: Normocephalic. Nose: Nose normal.  
   Mouth/Throat:  
   Mouth: Mucous membranes are moist.  
Cardiovascular:  
   Rate and Rhythm: none Pulses: Normal pulses. Pulmonary:  
   Effort: Pulmonary effort is normal.  
   Breath sounds: Normal breath sounds. Abdominal:  
   General: Bowel sounds are normal.  
Musculoskeletal: Normal range of motion. Right lower leg: Edema present. Left lower leg: Edema present. Skin: 
   General: Skin is warm and dry. Capillary Refill: Capillary refill takes less than 2 seconds. none CONSULTATIONS: none Significant Diagnostic Studies:  
 
Radiologic Studies - Results from [facility] Encounter encounter on 12/31/20 XR CHEST SNGL V  
 Narrative History is chest pain. Comparisons with the chest x-ray of 9/3/2019. An AP portable upright view of the chest demonstrate a prior median sternotomy 
for CABG and persistent cardiomegaly. There is increased interstitial 
infiltration which may be due to pulmonary edema. There is no consolidation, 
effusion or pneumothorax. The osseous structures are intact. Impression IMPRESSION: No change in prior median sternotomy for CABG and cardiomegaly. Increasing interstitial infiltration which has the appearance of progressive 
pulmonary edema. Please see full report. CT Results  (Last 48 hours) None Discharge time spent 35 minutes Signed: 
Michel Lyman MD 
2/15/2021 
3:20 PM